# Patient Record
Sex: FEMALE | Race: WHITE | Employment: FULL TIME | URBAN - METROPOLITAN AREA
[De-identification: names, ages, dates, MRNs, and addresses within clinical notes are randomized per-mention and may not be internally consistent; named-entity substitution may affect disease eponyms.]

---

## 2017-06-06 ENCOUNTER — OFFICE VISIT (OUTPATIENT)
Dept: FAMILY MEDICINE CLINIC | Age: 57
End: 2017-06-06

## 2017-06-06 VITALS
TEMPERATURE: 97.2 F | OXYGEN SATURATION: 98 % | WEIGHT: 134 LBS | HEIGHT: 65 IN | SYSTOLIC BLOOD PRESSURE: 106 MMHG | DIASTOLIC BLOOD PRESSURE: 80 MMHG | RESPIRATION RATE: 18 BRPM | HEART RATE: 74 BPM | BODY MASS INDEX: 22.33 KG/M2

## 2017-06-06 DIAGNOSIS — K21.9 GASTROESOPHAGEAL REFLUX DISEASE WITHOUT ESOPHAGITIS: ICD-10-CM

## 2017-06-06 DIAGNOSIS — F32.A DEPRESSION, UNSPECIFIED DEPRESSION TYPE: Primary | ICD-10-CM

## 2017-06-06 DIAGNOSIS — E78.5 DYSLIPIDEMIA: ICD-10-CM

## 2017-06-06 DIAGNOSIS — K26.9 DUODENAL ULCER DISEASE: ICD-10-CM

## 2017-06-06 DIAGNOSIS — M54.2 NECK PAIN: ICD-10-CM

## 2017-06-06 DIAGNOSIS — F41.8 ANXIETY ASSOCIATED WITH DEPRESSION: ICD-10-CM

## 2017-06-06 DIAGNOSIS — M25.552 PAIN OF LEFT HIP JOINT: ICD-10-CM

## 2017-06-06 DIAGNOSIS — I10 ESSENTIAL HYPERTENSION: ICD-10-CM

## 2017-06-06 DIAGNOSIS — G31.84 MILD COGNITIVE IMPAIRMENT: ICD-10-CM

## 2017-06-06 RX ORDER — SERTRALINE HYDROCHLORIDE 50 MG/1
75 TABLET, FILM COATED ORAL DAILY
Qty: 135 TAB | Refills: 3 | Status: SHIPPED | OUTPATIENT
Start: 2017-06-06 | End: 2017-12-20 | Stop reason: SDUPTHER

## 2017-06-06 RX ORDER — RANITIDINE 150 MG/1
150 TABLET, FILM COATED ORAL 2 TIMES DAILY
Qty: 90 TAB | Refills: 3 | Status: SHIPPED | OUTPATIENT
Start: 2017-06-06 | End: 2018-03-23 | Stop reason: SDUPTHER

## 2017-06-06 RX ORDER — AMLODIPINE BESYLATE 2.5 MG/1
2.5 TABLET ORAL DAILY
Qty: 90 TAB | Refills: 3 | Status: SHIPPED | OUTPATIENT
Start: 2017-06-06 | End: 2018-06-11 | Stop reason: SDUPTHER

## 2017-06-06 RX ORDER — ATORVASTATIN CALCIUM 20 MG/1
20 TABLET, FILM COATED ORAL DAILY
Qty: 90 TAB | Refills: 3 | Status: SHIPPED | OUTPATIENT
Start: 2017-06-06 | End: 2018-06-11 | Stop reason: SDUPTHER

## 2017-06-06 NOTE — MR AVS SNAPSHOT
Visit Information Date & Time Provider Department Dept. Phone Encounter #  
 6/6/2017 12:00 PM [address] [facility] Lisseth Sparks MD [facility] [phone] [id number] Upcoming Health Maintenance Date Due Hepatitis C Screening 1960 DTaP/Tdap/Td series (2 - Td) 7/7/2015 INFLUENZA AGE 9 TO ADULT 8/1/2017 BREAST CANCER SCRN MAMMOGRAM 3/3/2018 COLONOSCOPY 8/7/2018 PAP AKA CERVICAL CYTOLOGY 8/17/2018 Allergies as of 6/6/2017  Review Complete On: 6/6/2017 By: Enmanuel Acosta LPN Severity Noted Reaction Type Reactions Cephalexin High 08/31/2009    Rash Penicillins  08/31/2009    Other (comments) Itchy eyes. In childhood Current Immunizations  Reviewed on 12/23/2010 Name Date  
 TB Skin Test (PPD) Intradermal 7/18/2014 TDAP Vaccine 7/7/2005 Not reviewed this visit You Were Diagnosed With   
  
 Codes Comments Depression, unspecified depression type    -  Primary ICD-10-CM: F32.9 ICD-9-CM: 515 Anxiety associated with depression     ICD-10-CM: F41.8 ICD-9-CM: 300.4 Gastroesophageal reflux disease without esophagitis     ICD-10-CM: K21.9 ICD-9-CM: 530.81 Duodenal ulcer disease     ICD-10-CM: K26.9 ICD-9-CM: 532.90 Dyslipidemia     ICD-10-CM: E78.5 ICD-9-CM: 272.4 Essential hypertension     ICD-10-CM: I10 
ICD-9-CM: 401.9 Mild cognitive impairment     ICD-10-CM: G31.84 ICD-9-CM: 331.83 Pain of left hip joint     ICD-10-CM: M25.552 ICD-9-CM: 719.45 Neck pain     ICD-10-CM: M54.2 ICD-9-CM: 723.1 Vitals BP Pulse Temp Resp Height(growth percentile) Weight(growth percentile) 106/80 (BP 1 Location: Left arm, BP Patient Position: Sitting) 74 97.2 °F (36.2 °C) (Oral) 18 5' 5\" (1.651 m) 134 lb (60.8 kg) LMP SpO2 BMI OB Status Smoking Status 12/08/2010 98% 22.3 kg/m2 Postmenopausal Former Smoker Vitals History BMI and BSA Data Body Mass Index Body Surface Area 22.3 kg/m 2 1.67 m 2 Preferred Pharmacy Pharmacy Name Phone [address] AT [address] [place] [address]. [phone] Your Updated Medication List  
  
   
This list is accurate as of: 6/6/17  1:16 PM.  Always use your most recent med list. amLODIPine 2.5 mg tablet Commonly known as:  Mosetta Hark Take 1 Tab by mouth daily. aspirin delayed-release 81 mg tablet TK 1 T PO QD  
  
 atorvastatin 20 mg tablet Commonly known as:  LIPITOR Take 1 Tab by mouth daily. raNITIdine 150 mg tablet Commonly known as:  ZANTAC Take 1 Tab by mouth two (2) times a day. sertraline 50 mg tablet Commonly known as:  ZOLOFT Take 1.5 Tabs by mouth daily. Prescriptions Sent to Pharmacy Refills  
 sertraline (ZOLOFT) 50 mg tablet 3 Sig: Take 1.5 Tabs by mouth daily. Class: Normal  
 Pharmacy: [email or web address] [address] [place] [address], [address] [place] [address] AT [address] [place] [address]. Ph #: [phone] Route: Oral  
 raNITIdine (ZANTAC) 150 mg tablet 3 Sig: Take 1 Tab by mouth two (2) times a day. Class: Normal  
 [facility] [address] [place] [address], [address] [place] [address] AT [address] [place] [address] Road. Ph #: [phone] Route: Oral  
 amLODIPine (NORVASC) 2.5 mg tablet 3 Sig: Take 1 Tab by mouth daily. Class: Normal  
 Pharmacy: [facility] [address] [place] [address], [address] [place] [address] AT [address] [place] [address] Road. Ph #: [phone] Route: Oral  
 atorvastatin (LIPITOR) 20 mg tablet 3 Sig: Take 1 Tab by mouth daily. Class: Normal  
 Pharmacy: [facility] [address] [place] [address], [address] [place] [address] AT [address] [place] [address]. Ph #: [phone] Route: Oral  
  
Patient Instructions TODAY, please go to: CHECK OUT  
 LAB Please schedule the following appointments at [facility]: 
· Disability follow up with Dr Pastor Boyle in 3-4 weeks _____________________ Today's Plan: 
· Have labs ·  we will do complete neuro and ortho exam next time. · We will hold your papers. We are giving you a copy. They are not complete yet. · Start ranitidine every day 
_____________________ Review your health maintenance below. Make plans to return and address anything that is due or will be due soon. Health Maintenance Due Topic Date Due  
 Hepatitis C Test  1960  DTaP/Tdap/Td  (2 - Td) 07/07/2015  
 
 
 
_____________________ Are you stressed out? Overwhelmed? In pain? Feeling disconnected? Consider MINDFULNESS. .. 
[email or web address] 
_____________________ If you need to get your labs done at [facility], visit this site to find a convenient location: [email or web address] Tdap vaccine Please get a tetanus plus pertussis (whooping cough) vaccine at your pharmacy. This is also known as the Tdap vaccine. Ask the pharmacist to tell you what your copay will be. If you need to you can call your insurance company to ask more questions. After you get the vaccine, have the pharmacist fax in documentation to the office. Introducing [facility]! Seema Monae introduces [employer] patient portal. Now you can access parts of your medical record, email your doctor's office, and request medication refills online. 1. In your internet browser, go to [email or web address] 2. Click on the First Time User? Click Here link in the Sign In box. You will see the New Member Sign Up page. 3. Enter your [employer] Access Code exactly as it appears below. You will not need to use this code after youve completed the sign-up process. If you do not sign up before the expiration date, you must request a new code. · [employer] Access Code: [email or web address] Expires: 6/11/2017 10:07 AM 
 
 4. Enter the last four digits of your Social Security Number (xxxx) and Date of Birth (mm/dd/yyyy) as indicated and click Submit. You will be taken to the next sign-up page. 5. Create a [employer] ID. This will be your [employer] login ID and cannot be changed, so think of one that is secure and easy to remember. 6. Create a [employer] password. You can change your password at any time. 7. Enter your Password Reset Question and Answer. This can be used at a later time if you forget your password. 8. Enter your e-mail address. You will receive e-mail notification when new information is available in [address] 9. Click Sign Up. You can now view and download portions of your medical record. 10. Click the Download Summary menu link to download a portable copy of your medical information. If you have questions, please visit the Frequently Asked Questions section of the [employer] website. Remember, [employer] is NOT to be used for urgent needs. For medical emergencies, dial 911. Now available from your iPhone and Android! Please provide this summary of care documentation to your next provider. Your primary care clinician is listed as Broderick Sanchez. Booker Patino. If you have any questions after today's visit, please call [phone].

## 2017-06-06 NOTE — PATIENT INSTRUCTIONS
TODAY, please go to:   CHECK OUT    LAB    Please schedule the following appointments at CHECK OUT:  · Disability follow up with Dr Liz Woodson in 3-4 weeks   _____________________     ZKJGD'B Plan:  · Have labs  ·  we will do complete neuro and ortho exam next time. · We will hold your papers. We are giving you a copy. They are not complete yet. · Start ranitidine every day  _____________________     Review your health maintenance below. Make plans to return and address anything that is due or will be due soon. Health Maintenance Due   Topic Date Due    Hepatitis C Test  1960    DTaP/Tdap/Td  (2 - Td) 07/07/2015         _____________________     Are you stressed out? Overwhelmed? In pain? Feeling disconnected? Consider MINDFULNESS. ..  [email or web address]  _____________________     If you need to get your labs done at [facility], visit this site to find a convenient location: [email or web address]      Tdap vaccine  Please get a tetanus plus pertussis (whooping cough) vaccine at your pharmacy. This is also known as the Tdap vaccine. Ask the pharmacist to tell you what your copay will be. If you need to you can call your insurance company to ask more questions. After you get the vaccine, have the pharmacist fax in documentation to the office.

## 2017-06-06 NOTE — PROGRESS NOTES
Chief Complaint   Patient presents with    Medication Evaluation    Form Completion     stated that she needs paperwork for [facility] completed. 1. Have you been to the ER, urgent care clinic since your last visit? Hospitalized since your last visit? Yes, heart problems     2. Have you seen or consulted any other health care providers outside of the [address] [place] [address] since your last visit? Include any pap smears or colon screening. Yes, ER doctor for heart     The patient was counseled on the dangers of tobacco use, and was advised never to start again. Reviewed strategies to maximize success, including never to start again. Health Maintenance Due   Topic Date Due    Hepatitis C Screening Discussed with patient today and advised to follow up.    1960    DTaP/Tdap/Td series (2 - Td) Discussed with patient today and advised to follow up. Patient declines. 07/07/2015     ACP is not on file, advised to return.

## 2017-06-06 NOTE — PROGRESS NOTES
[address] Visit   Patient ID:   Stephanie Canela is a 59 y.o. female. Assessment/Plan:    Mariely Ji was seen today for medication evaluation, form completion and hip pain. Diagnoses and all orders for this visit:    Medications refilled for 90d. Form completed in detail for disability eval.   Return to complete eval as noted. Depression, unspecified depression type    Anxiety associated with depression  -     sertraline (ZOLOFT) 50 mg tablet; Take 1.5 Tabs by mouth daily. Gastroesophageal reflux disease without esophagitis  -     raNITIdine (ZANTAC) 150 mg tablet; Take 1 Tab by mouth two (2) times a day. Duodenal ulcer disease    Dyslipidemia  -     atorvastatin (LIPITOR) 20 mg tablet; Take 1 Tab by mouth daily. Essential hypertension  -     amLODIPine (NORVASC) 2.5 mg tablet; Take 1 Tab by mouth daily. Mild cognitive impairment    Pain of left hip joint  -     SED RATE (ESR)  -     RHEUMATOID FACTOR, QL  -     C REACTIVE PROTEIN, QT  -     CYCLIC CITRUL PEPTIDE AB, IGG    Neck pain      Next visit: complete neuro exam, complete neck and left hip ortho exam.    Counselled pt on:  Patient health concerns, plan as outlined in patient instructions and above. Patient was offered a choice/choices in the treatment plan today. Patient expresses understanding of the plan and agrees with recommendations. More than 50% of this >25 minute encounter was spent in counseling and coordination of care today. Patient Instructions     TODAY, please go to:   CHECK OUT    LAB    Please schedule the following appointments at CHECK OUT:  · Disability follow up with Dr Bennett Bose in 3-4 weeks   _____________________     JQZUMARCOS'A Plan:  · Have labs  ·  we will do complete neuro and ortho exam next time. · We will hold your papers. We are giving you a copy. They are not complete yet. · Start ranitidine every day  _____________________     Review your health maintenance below. Make plans to return and address anything that is due or will be due soon. Health Maintenance Due   Topic Date Due    Hepatitis C Test  1960    DTaP/Tdap/Td  (2 - Td) 07/07/2015         _____________________     Are you stressed out? Overwhelmed? In pain? Feeling disconnected? Consider MINDFULNESS. ..  [email or web address]  _____________________     If you need to get your labs done at [facility], visit this site to find a convenient location: [email or web address]      Tdap vaccine  Please get a tetanus plus pertussis (whooping cough) vaccine at your pharmacy. This is also known as the Tdap vaccine. Ask the pharmacist to tell you what your copay will be. If you need to you can call your insurance company to ask more questions. After you get the vaccine, have the pharmacist fax in documentation to the office. Subjective:   HPI:  Stephanie Canela is a 59 y.o. female being seen for:   Chief Complaint   Patient presents with    Medication Evaluation    Form Completion     stated that she needs paperwork for [facility] completed.  Hip Pain     stated that she had a dexa scan done in 2012 and doctor stated that her hip was tilting inward and she has recently been having some hip pain and notices a mild distortion to the left hip, also stated that pain is worse at night. · Last visit started omeprazole. Took for about 8 weeks. Now no longer taking. Seems to have improved and now getting worse again. 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      · See media for more re: mood, memory, hip pain, neck pain      Screening and Prevention Due:  Health Maintenance Due   Topic Date Due    Hepatitis C Screening  1960    DTaP/Tdap/Td series (2 - Td) 07/07/2015        Review of Systems  Otherwise as noted in HPI    Active Problem List:  Patient Active Problem List   Diagnosis Code    Allergic rhinitis, cause unspecified J30.9    Osteopenia M85.80    Depression F32.9    Pure hypercholesterolemia E78.00    Duodenal ulcer disease K26.9    Menopause Z78.0    Gastroesophageal reflux disease without esophagitis K21.9    Dyslipidemia E78.5    Essential hypertension I10    Mild cognitive impairment G31.84    Pain of left hip joint M25.552    Neck pain M54.2     ? Objective:     Visit Vitals    /80 (BP 1 Location: Left arm, BP Patient Position: Sitting)    Pulse 74    Temp 97.2 °F (36.2 °C) (Oral)    Resp 18    Ht 5' 5\" (1.651 m)    Wt 134 lb (60.8 kg)    SpO2 98%    BMI 22.3 kg/m2     Wt Readings from Last 3 Encounters:   06/06/17 134 lb (60.8 kg)   12/24/16 140 lb (63.5 kg)   12/12/16 134 lb 8 oz (61 kg)     BP Readings from Last 3 Encounters:   06/06/17 106/80   12/12/16 111/71   10/18/16 118/82     No flowsheet data found. Physical Exam   Constitutional: She appears well-developed and well-nourished. No distress. Pulmonary/Chest: Effort normal. No respiratory distress. Neurological: She is alert. Psychiatric: She has a normal mood and affect. Her behavior is normal. Cognition and memory are impaired. She exhibits abnormal recent memory. Allergies   Allergen Reactions    Cephalexin Rash    Penicillins Other (comments)     Itchy eyes. In childhood     Prior to Admission medications    Medication Sig Start Date End Date Taking? Authorizing Provider   sertraline (ZOLOFT) 50 mg tablet Take 1.5 Tabs by mouth daily. 6/6/17  Yes Clearance Dick Steven MD   raNITIdine (ZANTAC) 150 mg tablet Take 1 Tab by mouth two (2) times a day. 6/6/17  Yes Clearance Dcik Steven MD   amLODIPine (NORVASC) 2.5 mg tablet Take 1 Tab by mouth daily. 6/6/17  Yes Clearance Dick Steven MD   atorvastatin (LIPITOR) 20 mg tablet Take 1 Tab by mouth daily.  6/6/17  Yes Marcos Bonilla MD   aspirin delayed-release 81 mg tablet TK 1 T PO QD 12/6/16  Yes Historical Provider     . Cyndi Casillas Social History     Social History    Marital status: SINGLE     Spouse name: N/A    Number of children: N/A    Years of education: N/A     Occupational History     [employer]     Social History Main Topics    Smoking status: Former Smoker     Packs/day: 1.00     Years: 12.00     Types: Cigarettes     Quit date: 10/1/1987    Smokeless tobacco: Never Used    Alcohol use 1.5 - 2.0 oz/week     3 - 4 Glasses of wine per week      Comment: Red wine, weekly     Drug use: No    Sexual activity: Not Currently     Partners: Male     Birth control/ protection: Abstinence     Other Topics Concern    Not on file     Social History Narrative       Past Medical History:   Diagnosis Date    Abnormal Pap smear of cervix 2003    Allergic rhinitis, cause unspecified     Dr Osmany Sanford - trees, grass, molds, cats    Ankle sprain 10/1995    Right.  Breast cyst 2003, 2005    Right then Left. Benign.  Chest pain     12/5/16    Chickenpox 1995    Chronic cystic mastitis     Depression 2006    due to brother's death    Duodenal ulcer disease 1982    Heart palpitations 2005, 2008    Dr. Beth Haknins. due to caffeine or lack of sleep    Menopause 03/2012    Migraine     menstrual    OA (osteoarthritis) 08/2008    Right knee. Dr. Ulysses Grice Osteopenia 08/23/07    Plantar fasciitis 2003    Left.  Pneumonia     RLL    Popliteal cyst 08/2008    Right Knee. Dr. Ulysses Grice Pure hypercholesterolemia     Pure hypercholesterolemia     Radial head fracture, closed 08/1986    due to horse injury    Stress bladder incontinence        Past Surgical History:   Procedure Laterality Date    CARDIAC SURG PROCEDURE UNLIST  12/05/2016    Stress Test and Cath    COLONOSCOPY  08/07/08    Dr. Taran Lawson. No polyps.  due q 5-10    HX COLPOSCOPY  2003, 2008     Fredo Reyes HX CYST REMOVAL      middle digit on left hand.  HX OTHER SURGICAL  1994    Cryosurgery due to Abnormal PAP.   Dr. Hood Gottlieb       OB History      Para Term  AB TAB SAB Ectopic Multiple Living    1 1 1       1          Family History   Problem Relation Age of Onset    Cancer Mother      breast, not sure when dx, postmenopausal    Arthritis-osteo Mother     Hypertension Father     Heart Attack Father      onset at mid 47 y/o   Rachelle March Heart Disease Father     Diabetes Sister      prediabetes    High Cholesterol Sister     High Cholesterol Brother     Diabetes Maternal Grandmother     Stroke Paternal Grandmother     Osteoporosis Paternal Grandmother     Stroke Paternal Grandfather     Hypertension Brother     Other Brother      gliocytoma x 1

## 2017-06-07 LAB
CCP IGA+IGG SERPL IA-ACNC: 5 UNITS (ref 0–19)
CRP SERPL-MCNC: 0.5 MG/L (ref 0–4.9)
ERYTHROCYTE [SEDIMENTATION RATE] IN BLOOD BY WESTERGREN METHOD: 2 MM/HR (ref 0–40)
RHEUMATOID FACT SERPL-ACNC: <10 IU/ML (ref 0–13.9)

## 2017-06-15 ENCOUNTER — OFFICE VISIT (OUTPATIENT)
Dept: FAMILY MEDICINE CLINIC | Age: 57
End: 2017-06-15

## 2017-06-15 ENCOUNTER — TELEPHONE (OUTPATIENT)
Dept: FAMILY MEDICINE CLINIC | Age: 57
End: 2017-06-15

## 2017-06-15 VITALS
SYSTOLIC BLOOD PRESSURE: 102 MMHG | DIASTOLIC BLOOD PRESSURE: 66 MMHG | BODY MASS INDEX: 23.32 KG/M2 | HEART RATE: 70 BPM | WEIGHT: 140 LBS | TEMPERATURE: 97.7 F | OXYGEN SATURATION: 97 % | RESPIRATION RATE: 16 BRPM | HEIGHT: 65 IN

## 2017-06-15 DIAGNOSIS — M54.2 NECK PAIN: ICD-10-CM

## 2017-06-15 DIAGNOSIS — G31.84 MILD COGNITIVE IMPAIRMENT: ICD-10-CM

## 2017-06-15 DIAGNOSIS — Z11.59 NEED FOR HEPATITIS C SCREENING TEST: ICD-10-CM

## 2017-06-15 DIAGNOSIS — F32.A DEPRESSION, UNSPECIFIED DEPRESSION TYPE: ICD-10-CM

## 2017-06-15 DIAGNOSIS — Z02.71 DISABILITY EXAMINATION: Primary | ICD-10-CM

## 2017-06-15 DIAGNOSIS — M25.552 PAIN OF LEFT HIP JOINT: ICD-10-CM

## 2017-06-15 NOTE — LETTER
6/15/2017 9:58 AM 
 
Ms. Simon [address] [address] [phone] To whom it may concern:  
 
Please see below for medication list, comprehensive neurology exam and comprehensive orthopedic exam.  
 
 
Visit Vitals  /66 (BP 1 Location: Left arm, BP Patient Position: Sitting)  Pulse 70  Temp 97.7 °F (36.5 °C) (Oral)  Resp 16  
 Ht 5' 5\" (1.651 m)  Wt 140 lb (63.5 kg)  SpO2 97%  BMI 23.3 kg/m2 Wt Readings from Last 3 Encounters:  
06/15/17 140 lb (63.5 kg) 06/06/17 134 lb (60.8 kg) 12/24/16 140 lb (63.5 kg) BP Readings from Last 3 Encounters:  
06/15/17 102/66  
06/06/17 106/80  
12/12/16 111/71 Physical Exam  
Eyes: EOM are normal. Pupils are equal, round, and reactive to light. Neck: Muscular tenderness (bilateral ) present. No spinous process tenderness present. Decreased ROM as follows: flexion, rotation to left, rotation to right, lateral bend to left, lateral bend to right Normal extension Normal appearance. No visible exterior deformity. Neg spurling b/l Musculoskeletal: No ttp to shoulders, elbows, wrists, hands, knees, ankles. Neurological: She has a normal Finger-Nose-Finger Test, a normal Heel to [employer] and a normal Romberg Test.  
Reflex Scores: 
     Bicep reflexes are 2+ on the right side and 2+ on the left side. Patellar reflexes are 2+ on the right side and 2+ on the left side. Achilles reflexes are 2+ on the right side and 2+ on the left side. Psychiatric: Her speech is normal. She is not agitated and not aggressive. Cognition and memory are impaired. She exhibits abnormal recent memory and abnormal remote memory. Mildly anxious Cristi Barthel Cristi Barthel Neurologic Exam  
 
Mental Status Disoriented to person. Disoriented to place. Oriented to year, month and date. Speech: speech is normal  
Level of consciousness: alert Knowledge: consistent with education. Cranial Nerves CN II Visual acuity: normal with correction CN III, IV, VI  
Pupils are equal, round, and reactive to light. Extraocular motions are normal.  
Nystagmus: none Ophthalmoparesis: none CN V Facial sensation intact. CN VII Facial expression full, symmetric. CN VIII  
CN VIII normal.  
Hearing: intact CN IX, X  
CN IX normal.  
Palate: symmetric CN XI  
CN XI normal.  
Right sternocleidomastoid strength: normal 
Left sternocleidomastoid strength: normal 
Right trapezius strength: normal 
Left trapezius strength: normal 
 
CN XII  
CN XII normal.  
Tongue: not atrophic Motor Exam  
Muscle bulk: normal 
Overall muscle tone: normal 
 
Strength Right deltoid: 5/5 Left deltoid: 5/5 Right biceps: 5/5 Left biceps: 5/5 Right triceps: 5/5 Left triceps: 5/5 Right quadriceps: 5/5 Left quadriceps: 5/5 Right hamstrin/5 Left hamstrin/5 Right anterior tibial: 5/5 Left anterior tibial: 5/5 Right posterior tibial: 5/5 Left posterior tibial: 5/5 Right peroneal: 5/5 Left peroneal: 5/5 Right gastroc: 5/5 Left gastroc: 5/5 Sensory Exam  
Light touch normal.  
 
Gait, Coordination, and Reflexes Coordination Romberg: negative Finger to nose coordination: normal 
Heel to shin coordination: normal 
 
Tremor Resting tremor: absent Intention tremor: absent Reflexes Right biceps: 2+ Left biceps: 2+ Right patellar: 2+ Left patellar: 2+ Right achilles: 2+ Left achilles: 2+ Dundee Halt Dundee Halt Left Hip Exam  
 
Comments:  No LS midline ttp There is lumbar muscular ttp on left There is some ttp on left iliac crest 
 
Pain at hip with IR Pain in lower back with ER There is ttp at greater trochanter. Back Exam  
 
Muscle Strength Right Quadriceps:  5/5 Left Quadriceps:  5/5 Right Hamstrings:  5/5 Left Hamstrings:  5/5 Dundee Halt Dundee Halt Left Hip Exam  
 
Tenderness The patient is experiencing tenderness in the greater trochanter, lateral. 
 
Range of Motion Extension:           Abnormal 
Flexion:                 Abnormal 
Internal Rotation:  Abnormal 
External Rotation: Abduction:            Normal 
Adduction:            Normal 
 
Muscle Strength Abduction:  5/5 Adduction:  5/5 Flexion:      5/5 Tests Miky: Positive Sandra:  Negative Comments:  No LS midline ttp There is lumbar muscular ttp on left There is some ttp on left iliac crest 
 
Pain at hip with IR Pain in lower back with ER There is ttp at greater trochanter. Allergies Allergen Reactions  Cephalexin Rash  Penicillins Other (comments) Itchy eyes. In childhood Prior to Admission medications Medication Sig Start Date End Date Taking? Authorizing Provider  
sertraline (ZOLOFT) 50 mg tablet Take 1.5 Tabs by mouth daily. 6/6/17  Yes Charmayne Severe Andrea Barnes, MD  
raNITIdine (ZANTAC) 150 mg tablet Take 1 Tab by mouth two (2) times a day. 6/6/17  Yes Charmayne Severe Andrea Barnes, MD  
amLODIPine (NORVASC) 2.5 mg tablet Take 1 Tab by mouth daily. 6/6/17  Yes Charmayne Severe Andrea Barnes, MD  
atorvastatin (LIPITOR) 20 mg tablet Take 1 Tab by mouth daily. 6/6/17  Yes Charmayne Severe Andrea Barnes, MD  
aspirin delayed-release 81 mg tablet TK 1 T PO QD 12/6/16  Yes Historical Provider Sincerely, 
 
 
Charmayne Severe Andrea Barnes, MD

## 2017-06-15 NOTE — MR AVS SNAPSHOT
Visit Information Date & Time Provider Department Dept. Phone Encounter #  
 6/15/2017  9:00 AM [address] Dania Deutsch MD [facility] [phone] [id number] Upcoming Health Maintenance Date Due Hepatitis C Screening 1960 DTaP/Tdap/Td series (2 - Td) 7/7/2015 INFLUENZA AGE 9 TO ADULT 8/1/2017 BREAST CANCER SCRN MAMMOGRAM 3/3/2018 COLONOSCOPY 8/7/2018 PAP AKA CERVICAL CYTOLOGY 8/17/2018 Allergies as of 6/15/2017  Review Complete On: 6/15/2017 By: Cheryl Cid LPN Severity Noted Reaction Type Reactions Cephalexin High 08/31/2009    Rash Penicillins  08/31/2009    Other (comments) Itchy eyes. In childhood Current Immunizations  Reviewed on 12/23/2010 Name Date  
 TB Skin Test (PPD) Intradermal 7/18/2014 TDAP Vaccine 7/7/2005 Not reviewed this visit Vitals BP Pulse Temp Resp Height(growth percentile) Weight(growth percentile) 102/66 (BP 1 Location: Left arm, BP Patient Position: Sitting) 70 97.7 °F (36.5 °C) (Oral) 16 5' 5\" (1.651 m) 140 lb (63.5 kg) LMP SpO2 BMI OB Status Smoking Status 12/08/2010 97% 23.3 kg/m2 Postmenopausal Former Smoker BMI and BSA Data Body Mass Index Body Surface Area  
 23.3 kg/m 2 1.71 m 2 Preferred Pharmacy Pharmacy Name Phone [address] [facility] AT [address] [place] [address]. [phone] Your Updated Medication List  
  
   
This list is accurate as of: 6/15/17 10:13 AM.  Always use your most recent med list. amLODIPine 2.5 mg tablet Commonly known as:  John Equality Take 1 Tab by mouth daily. aspirin delayed-release 81 mg tablet TK 1 T PO QD  
  
 atorvastatin 20 mg tablet Commonly known as:  LIPITOR Take 1 Tab by mouth daily. raNITIdine 150 mg tablet Commonly known as:  ZANTAC Take 1 Tab by mouth two (2) times a day. sertraline 50 mg tablet Commonly known as:  ZOLOFT Take 1.5 Tabs by mouth daily. Introducing [facility]! Dear Tiny Sylvester: 
Thank you for requesting a AddFleet account. Our records indicate that you already have an active AddFleet account. You can access your account anytime at [email or web address]. [email or web address] Did you know that you can access your hospital and ER discharge instructions at any time in AddFleet? You can also review all of your test results from your hospital stay or ER visit. Additional Information If you have questions, please visit the Frequently Asked Questions section of the AddFleet website at [email or web address] Remember, AddFleet is NOT to be used for urgent needs. For medical emergencies, dial 911. Now available from your iPhone and Android! Please provide this summary of care documentation to your next provider. Your primary care clinician is listed as Sandip English. If you have any questions after today's visit, please call [phone].

## 2017-06-15 NOTE — TELEPHONE ENCOUNTER
----- Message from Opal Wynn. Lenin Ortega MD sent at 6/15/2017 11:18 AM EDT -----  Regarding: call patient and   Will you call patient and recommend the following. I recommend sharing this with patient and . Since her insurance may be changing, it is a good idea to update her health maintenance:  - schedule a lab visit before the end of the month to complete hepatitis C screening (blood test)  - schedule nurse visit to get Tdap. Her last one was in 2005. Do both of these before the end of June. Also ask them to schedule follow up in 6 months for mood, blood pressure, memory.     Thank you,  CWT

## 2017-06-15 NOTE — TELEPHONE ENCOUNTER
Contacted patient to try and advise her of recommendations, of things needed to be completed before her insurance coverage ends at the end of June. No answer advised patient to give me a call back here at the office.  ARR 06/15/17

## 2017-06-15 NOTE — PROGRESS NOTES
[address] Visit   Patient ID:   Kayce Borjas is a 59 y.o. female. Assessment/Plan:    Charles Greene was seen today for form completion and neurologic problem. Diagnoses and all orders for this visit:    Disability examination  Mild cognitive impairment  Pain of left hip joint  Neck pain  Depression, unspecified depression type  Paper work completed today. Copy of paperwork and supporting documents given to pt and  and original was faxed. Reviewed looking for activities     Need for hepatitis C screening test  Can get test next time she has labs. -     HEPATITIS C AB    Next visit: f/u in 6 month for mood, memory, BP follow up  Recommend tdap and hcv test this month. Nurse will call    Counselled pt on:  Patient health concerns, plan as outlined in patient instructions and above. Patient was offered a choice/choices in the treatment plan today. Patient expresses understanding of the plan and agrees with recommendations.     Subjective:   HPI:  Kayce Borjas is a 59 y.o. female being seen for:   Chief Complaint   Patient presents with    Form Completion    Neurologic Problem     cognitive test to be completed      · Present with   · Here to complete exam for disability  · Last day of school is tomorrow  · Wonders what type of work/activities she could do  · Stress from forgetting things even with house tasks  · Wanted to make sure I knew about her prior heart evaluation around the start of the year    Screening and Prevention Due:  Health Maintenance Due   Topic Date Due    Hepatitis C Screening  1960    DTaP/Tdap/Td series (2 - Td) 07/07/2015     Immunization History   Administered Date(s) Administered    TB Skin Test (PPD) Intradermal 07/18/2014    TDAP Vaccine 07/07/2005          Review of Systems  Otherwise as noted in HPI    Active Problem List:  Patient Active Problem List   Diagnosis Code    Allergic rhinitis, cause unspecified J30.9    Osteopenia M85.80    Depression F32.9    Pure hypercholesterolemia E78.00    Duodenal ulcer disease K26.9    Menopause Z78.0    Gastroesophageal reflux disease without esophagitis K21.9    Dyslipidemia E78.5    Essential hypertension I10    Mild cognitive impairment G31.84    Pain of left hip joint M25.552    Neck pain M54.2     ? Objective:     Visit Vitals    /66 (BP 1 Location: Left arm, BP Patient Position: Sitting)    Pulse 70    Temp 97.7 °F (36.5 °C) (Oral)    Resp 16    Ht 5' 5\" (1.651 m)    Wt 140 lb (63.5 kg)    SpO2 97%    BMI 23.3 kg/m2     Wt Readings from Last 3 Encounters:   06/15/17 140 lb (63.5 kg)   06/06/17 134 lb (60.8 kg)   12/24/16 140 lb (63.5 kg)     BP Readings from Last 3 Encounters:   06/15/17 102/66   06/06/17 106/80   12/12/16 111/71     PHQ over the last two weeks 6/6/2017   Little interest or pleasure in doing things More than half the days   Feeling down, depressed or hopeless More than half the days   Total Score PHQ 2 4   Trouble falling or staying asleep, or sleeping too much Not at all   Feeling tired or having little energy Nearly every day   Poor appetite or overeating Nearly every day   Feeling bad about yourself - or that you are a failure or have let yourself or your family down More than half the days   Trouble concentrating on things such as school, work, reading or watching TV Nearly every day   Moving or speaking so slowly that other people could have noticed; or the opposite being so fidgety that others notice Not at all   Thoughts of being better off dead, or hurting yourself in some way Not at all   PHQ 9 Score 15   How difficult have these problems made it for you to do your work, take care of your home and get along with others Very difficult         Physical Exam   Eyes: EOM are normal. Pupils are equal, round, and reactive to light. Neck: Muscular tenderness (bilateral ) present. No spinous process tenderness present.    Decreased ROM as follows: flexion, rotation to left, rotation to right, lateral bend to left, lateral bend to right    Normal extension     Normal appearance. No visible exterior deformity. Neg spurling b/l   Musculoskeletal:   No ttp to shoulders, elbows, wrists, hands, knees, ankles. Neurological: She has a normal Finger-Nose-Finger Test, a normal Heel to [employer] and a normal Romberg Test.   Reflex Scores:       Bicep reflexes are 2+ on the right side and 2+ on the left side. Patellar reflexes are 2+ on the right side and 2+ on the left side. Achilles reflexes are 2+ on the right side and 2+ on the left side. Psychiatric: Her speech is normal. She is not agitated and not aggressive. Cognition and memory are impaired. She exhibits abnormal recent memory and abnormal remote memory. Mildly anxious        . [facility] Neurologic Exam     Mental Status   Disoriented to person. Disoriented to place. Oriented to year, month and date. Speech: speech is normal   Level of consciousness: alert  Knowledge: consistent with education. Cranial Nerves     CN II   Visual acuity: normal with correction    CN III, IV, VI   Pupils are equal, round, and reactive to light. Extraocular motions are normal.   Nystagmus: none   Ophthalmoparesis: none    CN V   Facial sensation intact. CN VII   Facial expression full, symmetric.      CN VIII   CN VIII normal.   Hearing: intact    CN IX, X   CN IX normal.   Palate: symmetric    CN XI   CN XI normal.   Right sternocleidomastoid strength: normal  Left sternocleidomastoid strength: normal  Right trapezius strength: normal  Left trapezius strength: normal    CN XII   CN XII normal.   Tongue: not atrophic    Motor Exam   Muscle bulk: normal  Overall muscle tone: normal    Strength   Right deltoid: 5/5  Left deltoid: 5/5  Right biceps: 5/5  Left biceps: 5/5  Right triceps: 5/5  Left triceps: 5/5  Right quadriceps: 5/5  Left quadriceps: 5/5  Right hamstrin/5  Left hamstring: 5/5  Right anterior tibial: 5/5  Left anterior tibial: 5/5  Right posterior tibial: 5/5  Left posterior tibial: 5/5  Right peroneal: 5/5  Left peroneal: 5/5  Right gastroc: 5/5  Left gastroc: 5/5    Sensory Exam   Light touch normal.     Gait, Coordination, and Reflexes     Coordination   Romberg: negative  Finger to nose coordination: normal  Heel to shin coordination: normal    Tremor   Resting tremor: absent  Intention tremor: absent    Reflexes   Right biceps: 2+  Left biceps: 2+  Right patellar: 2+  Left patellar: 2+  Right achilles: 2+  Left achilles: 2+      . Bradleynatalya Vilma Left Hip Exam     Comments:  No LS midline ttp  There is lumbar muscular ttp on left  There is some ttp on left iliac crest    Pain at hip with IR  Pain in lower back with ER    There is ttp at greater trochanter. Back Exam     Muscle Strength   Right Quadriceps:  5/5   Left Quadriceps:  5/5   Right Hamstrings:  5/5   Left Hamstrings:  5/5           . Faisal Vilma Left Hip Exam     Tenderness   The patient is experiencing tenderness in the greater trochanter, lateral.    Range of Motion   Extension:           Abnormal  Flexion:                 Abnormal  Internal Rotation:  Abnormal  External Rotation:  Abduction:            Normal  Adduction:            Normal    Muscle Strength   Abduction:  5/5  Adduction:  5/5  Flexion:      5/5    Tests   Miky: Positive  Sandra:  Negative    Comments:  No LS midline ttp  There is lumbar muscular ttp on left  There is some ttp on left iliac crest    Pain at hip with IR  Pain in lower back with ER    There is ttp at greater trochanter. Allergies   Allergen Reactions    Cephalexin Rash    Penicillins Other (comments)     Itchy eyes. In childhood     Prior to Admission medications    Medication Sig Start Date End Date Taking? Authorizing Provider   sertraline (ZOLOFT) 50 mg tablet Take 1.5 Tabs by mouth daily. 6/6/17  Yes Jane Choudhury MD   raNITIdine (ZANTAC) 150 mg tablet Take 1 Tab by mouth two (2) times a day. 6/6/17  Yes Catracho Deutsch MD   amLODIPine (NORVASC) 2.5 mg tablet Take 1 Tab by mouth daily. 6/6/17  Yes Catracho Deutsch MD   atorvastatin (LIPITOR) 20 mg tablet Take 1 Tab by mouth daily. 6/6/17  Yes Catracho Deutsch MD   aspirin delayed-release 81 mg tablet TK 1 T PO QD 12/6/16  Yes Historical Provider

## 2017-06-15 NOTE — PROGRESS NOTES
Chief Complaint   Patient presents with    Form Completion    Neurologic Problem     cognitive test to be completed      1. Have you been to the ER, urgent care clinic since your last visit? Hospitalized since your last visit? No     2. Have you seen or consulted any other health care providers outside of the [address] [place] [address] since your last visit? Include any pap smears or colon screening. No     The patient was counseled on the dangers of tobacco use, and was advised never to start again. Reviewed strategies to maximize success, including never to start again. Health Maintenance Due   Topic Date Due    Hepatitis C Screening Discussed with patient today and advised to follow up. Stated she would have completed. 1960    DTaP/Tdap/Td series (2 - Td) Discussed with patient today and advised to follow up. Stated that she would receive today. 07/07/2015     ACP is not on file, advised to return.

## 2017-08-25 ENCOUNTER — OFFICE VISIT (OUTPATIENT)
Dept: FAMILY MEDICINE CLINIC | Age: 57
End: 2017-08-25

## 2017-08-25 VITALS
SYSTOLIC BLOOD PRESSURE: 114 MMHG | OXYGEN SATURATION: 100 % | WEIGHT: 144 LBS | HEART RATE: 61 BPM | TEMPERATURE: 97 F | BODY MASS INDEX: 23.99 KG/M2 | DIASTOLIC BLOOD PRESSURE: 73 MMHG | HEIGHT: 65 IN | RESPIRATION RATE: 18 BRPM

## 2017-08-25 DIAGNOSIS — Z00.00 LABORATORY EXAM ORDERED AS PART OF ROUTINE GENERAL MEDICAL EXAMINATION: ICD-10-CM

## 2017-08-25 DIAGNOSIS — G31.84 MILD COGNITIVE IMPAIRMENT: ICD-10-CM

## 2017-08-25 DIAGNOSIS — M25.552 PAIN OF BOTH HIP JOINTS: ICD-10-CM

## 2017-08-25 DIAGNOSIS — Z23 ENCOUNTER FOR IMMUNIZATION: ICD-10-CM

## 2017-08-25 DIAGNOSIS — M54.2 NECK PAIN: Primary | ICD-10-CM

## 2017-08-25 DIAGNOSIS — E55.9 VITAMIN D DEFICIENCY: ICD-10-CM

## 2017-08-25 DIAGNOSIS — M25.551 PAIN OF BOTH HIP JOINTS: ICD-10-CM

## 2017-08-25 DIAGNOSIS — I10 ESSENTIAL HYPERTENSION: ICD-10-CM

## 2017-08-25 RX ORDER — DICLOFENAC SODIUM 10 MG/G
2-4 GEL TOPICAL
Qty: 100 G | Refills: 5 | Status: SHIPPED | OUTPATIENT
Start: 2017-08-25 | End: 2021-11-30

## 2017-08-25 RX ORDER — NAPROXEN 500 MG/1
500 TABLET ORAL
Qty: 90 TAB | Refills: 2 | Status: SHIPPED | OUTPATIENT
Start: 2017-08-25 | End: 2021-11-30

## 2017-08-25 NOTE — PATIENT INSTRUCTIONS
TODAY, please go to:   CHECK OUT    Tdap    Please schedule the following appointments at [address] [place] [address]:  · Lab visit, fasting no later than Aug 31- okay per Dr. Carrie Cobb  · [facility] appointment on Aug 30, if available  · Pain, mood, memory follow up with Dr. Carrie Cobb anytime between Nov 2017 and Feb 2018    Today's Plan:  - Flu season is coming! Remember to get your flu vaccine!     - HAVE hip Xrays before Aug 31s  -  prescriptions before Aug 31st  - I am refilling your medicaitons    For neck and hip pain:  - do stretches and exercises  - heat  - voltaren gel - rub into the painful areas of your neck when needed. Do not combine with naprosyn or ibuprofen. - naprosyn- pill by mouth when needed. Do not take with gel.   - can also take over the counter tylenol   Use Tylenol prn pain, fever, or headache. Never exceed more than 3000 milligrams (mg) daily, if you are over 72years old. Never exceed more than 4000 miligrams (mg) daily, if you are under 72years old.

## 2017-08-25 NOTE — PROGRESS NOTES
Chief Complaint   Patient presents with    Form Completion     stated that she needs to follow up on disability, not sure if the forms were completed or not.  Hip Pain     left hip pain     1. Have you been to the ER, urgent care clinic since your last visit? Hospitalized since your last visit? No     2. Have you seen or consulted any other health care providers outside of the [address] [place] [address] since your last visit? Include any pap smears or colon screening. No     The patient was counseled on the dangers of tobacco use, and was advised never to start again. Reviewed strategies to maximize success, including never to start again. Health Maintenance Due   Topic Date Due    Hepatitis C Screening Discussed with patient today and advised to follow up.    1960    DTaP/Tdap/Td series (2 - Td) Discussed with patient today and advised to follow up.    07/07/2015    INFLUENZA AGE 9 TO ADULT Discussed with patient today and advised to follow up.  08/01/2017     ACP is not on file, advised to return.

## 2017-08-25 NOTE — PROGRESS NOTES
.. [address] Visit   Patient ID:   Marco Antonio Robert is a 59 y.o. female. Assessment/Plan:    Diagnoses and all orders for this visit:    1. Neck pain  D/t msk strain. im  -     diclofenac (VOLTAREN) 1 % gel; Apply 2-4 g to affected area four (4) times daily as needed. Massage into neck or hip. Do not take with other NSAID  -     naproxen (NAPROSYN) 500 mg tablet; Take 1 Tab by mouth two (2) times daily as needed. Neck or hip pain    2. Mild cognitive impairment  Continue treating mood. F/u with Neurology in coming months/year    3. Essential hypertension    4. Pain of both hip joints  Image hips. tx per pt instructions  -     diclofenac (VOLTAREN) 1 % gel; Apply 2-4 g to affected area four (4) times daily as needed. Massage into neck or hip. Do not take with other NSAID  -     naproxen (NAPROSYN) 500 mg tablet; Take 1 Tab by mouth two (2) times daily as needed. Neck or hip pain    5. Laboratory exam ordered as part of routine general medical examination  -     LIPID PANEL  -     HEMOGLOBIN A1C WITH EAG  -     CBC W/O DIFF  -     METABOLIC PANEL, COMPREHENSIVE    6. Vitamin D deficiency  -     VITAMIN D, 25 HYDROXY    7. Encounter for immunization  -     Tetanus, diphtheria toxoids and acellular pertussis (TDAP) vaccine, in individuals >=7 years, IM  -     OK IMMUNIZ ADMIN,1 SINGLE/COMB VAC/TOXOID      See patient instructions for more. Counselled pt on:  Patient health concerns, plan as outlined in patient instructions and above. Patient was offered a choice/choices in the treatment plan today. Patient expresses understanding of the plan and agrees with recommendations.         Patient Instructions   TODAY, please go to:   CHECK OUT    Tdap    Please schedule the following appointments at [address] [place] [address]:  · Lab visit, fasting no later than Aug 31- okay per Dr. Atif Means  · [facility] appointment on Aug 30, if available  · Pain, mood, memory follow up with Dr. Atif Means anytime between Nov 2017 and Feb 2018    Today's Plan:  - Flu season is coming! Remember to get your flu vaccine!     - HAVE hip Xrays before Aug 31s  -  prescriptions before Aug 31st  - I am refilling your medicaitons    For neck and hip pain:  - do stretches and exercises  - heat  - voltaren gel - rub into the painful areas of your neck when needed. Do not combine with naprosyn or ibuprofen. - naprosyn- pill by mouth when needed. Do not take with gel.   - can also take over the counter tylenol   Use Tylenol prn pain, fever, or headache. Never exceed more than 3000 milligrams (mg) daily, if you are over 72years old. Never exceed more than 4000 miligrams (mg) daily, if you are under 72years old. Subjective:   HPI:  Armin Carvajal is a 59 y.o. female being seen for:   Chief Complaint   Patient presents with    Form Completion     stated that she needs to follow up on disability, not sure if the forms were completed or not.  Hip Pain     left hip pain    Leg Pain     stated that she gets twitches in her right leg often.  Immunization/Injection     · \"i'm tired\"  · Putting in paperwork for disability, had not finished b/c she is tired.      Neck pain  · See physical from June 2017  ·  just rubs it   · Annoying pain  · Soreness    Leg pain  · Feels pain in b/l lateral hips, sometimes knee  · Occasional low back pain, but mostly lateral thighs, L>R    Mood  · Doing better, not as much rumination, worry  · Male partner helps her   · Still forgets but     Screening and Prevention Due:  Health Maintenance Due   Topic Date Due    Hepatitis C Screening  1960    INFLUENZA AGE 9 TO ADULT  08/01/2017      Review of Systems  Otherwise as noted in HPI    Active Problem List:  Patient Active Problem List   Diagnosis Code    Allergic rhinitis, cause unspecified J30.9    Osteopenia M85.80    Depression F32.9    Pure hypercholesterolemia E78.00    Duodenal ulcer disease K26.9    Menopause Z78.0    Gastroesophageal reflux disease without esophagitis K21.9    Dyslipidemia E78.5    Essential hypertension I10    Mild cognitive impairment G31.84    Pain of left hip joint M25.552    Neck pain M54.2     ? Objective:     Visit Vitals    /73 (BP 1 Location: Left arm, BP Patient Position: Sitting)    Pulse 61    Temp 97 °F (36.1 °C) (Oral)    Resp 18    Ht 5' 5\" (1.651 m)    Wt 144 lb (65.3 kg)    SpO2 100%    BMI 23.96 kg/m2     Wt Readings from Last 3 Encounters:   08/25/17 144 lb (65.3 kg)   06/15/17 140 lb (63.5 kg)   06/06/17 134 lb (60.8 kg)     BP Readings from Last 3 Encounters:   08/25/17 114/73   06/15/17 102/66   06/06/17 106/80       Physical Exam   Constitutional: She appears well-developed and well-nourished. No distress. HENT:   Right Ear: Tympanic membrane, external ear and ear canal normal.   Left Ear: Tympanic membrane, external ear and ear canal normal.   Mouth/Throat: Oropharynx is clear and moist. No oropharyngeal exudate. Eyes: Conjunctivae are normal. Pupils are equal, round, and reactive to light. Right eye exhibits no discharge. Left eye exhibits no discharge. No scleral icterus. Neck: Neck supple. No thyromegaly present. Cardiovascular: Normal rate, regular rhythm and normal heart sounds. Exam reveals no gallop and no friction rub. No murmur heard. Pulmonary/Chest: Effort normal and breath sounds normal. No respiratory distress. She has no decreased breath sounds. She has no wheezes. She has no rhonchi. She has no rales. Abdominal: Soft. Bowel sounds are normal. She exhibits no distension and no mass. There is no hepatosplenomegaly. There is no tenderness. There is no rigidity, no rebound and no guarding. Musculoskeletal: She exhibits no edema. Lateral hips with only mild ttp   Lymphadenopathy:     She has no cervical adenopathy. Right: No supraclavicular adenopathy present. Left: No supraclavicular adenopathy present.    Neurological: She is alert. She has normal strength and normal reflexes. No sensory deficit. She exhibits normal muscle tone. Skin: She is not diaphoretic. Psychiatric: Her behavior is normal. Her mood appears anxious. Allergies   Allergen Reactions    Cephalexin Rash    Penicillins Other (comments)     Itchy eyes. In childhood     Prior to Admission medications    Medication Sig Start Date End Date Taking? Authorizing Provider   diclofenac (VOLTAREN) 1 % gel Apply 2-4 g to affected area four (4) times daily as needed. Massage into neck or hip. Do not take with other NSAID 8/25/17  Yes Birdia Coad. Miller Novak MD   naproxen (NAPROSYN) 500 mg tablet Take 1 Tab by mouth two (2) times daily as needed. Neck or hip pain 8/25/17  Yes Cristina Ingram. Miller Novak MD   sertraline (ZOLOFT) 50 mg tablet Take 1.5 Tabs by mouth daily. 6/6/17  Yes Fabrizio Novak MD   raNITIdine (ZANTAC) 150 mg tablet Take 1 Tab by mouth two (2) times a day. 6/6/17  Yes Fabrizio Novak MD   amLODIPine (NORVASC) 2.5 mg tablet Take 1 Tab by mouth daily. 6/6/17  Yes Fabrizio Noavk MD   atorvastatin (LIPITOR) 20 mg tablet Take 1 Tab by mouth daily. 6/6/17  Yes Fabrizio Novak MD   aspirin delayed-release 81 mg tablet TK 1 T PO QD 12/6/16  Yes Historical Provider

## 2017-08-25 NOTE — MR AVS SNAPSHOT
Visit Information Date & Time Provider Department Dept. Phone Encounter #  
 8/25/2017  9:40 AM Timo Barrett. Yuriy Alaniz MD [facility] [phone] [id number] Your Appointments 10/17/2017 10:00 AM  
COMPLETE 40 with Blake Campuzano Yuriy Alaniz [address] ([address]) Appt Note: Complete 40 (40 min) - lp  
 [address] 
[address] [place] [address]  
[phone]  
  
   
 [address] [facility] [address] [facility] Upcoming Health Maintenance Date Due Hepatitis C Screening 1960 DTaP/Tdap/Td series (2 - Td) 7/7/2015 INFLUENZA AGE 9 TO ADULT 8/1/2017 BREAST CANCER SCRN MAMMOGRAM 3/3/2018 COLONOSCOPY 8/7/2018 PAP AKA CERVICAL CYTOLOGY 8/17/2018 Allergies as of 8/25/2017  Review Complete On: 8/25/2017 By: Morena Aguayo LPN Severity Noted Reaction Type Reactions Cephalexin High 08/31/2009    Rash Penicillins  08/31/2009    Other (comments) Itchy eyes. In childhood Current Immunizations  Reviewed on 12/23/2010 Name Date  
 TB Skin Test (PPD) Intradermal 7/18/2014 TDAP Vaccine 7/7/2005 Not reviewed this visit Vitals BP Pulse Temp Resp Height(growth percentile) Weight(growth percentile) 114/73 (BP 1 Location: Left arm, BP Patient Position: Sitting) 61 97 °F (36.1 °C) (Oral) 18 5' 5\" (1.651 m) 144 lb (65.3 kg) LMP SpO2 BMI OB Status Smoking Status 12/08/2010 100% 23.96 kg/m2 Postmenopausal Former Smoker Vitals History BMI and BSA Data Body Mass Index Body Surface Area  
 23.96 kg/m 2 1.73 m 2 Preferred Pharmacy Pharmacy Name Phone [address] AT [address] [place] [address]. [phone] Your Updated Medication List  
  
   
This list is accurate as of: 8/25/17 11:44 AM.  Always use your most recent med list. amLODIPine 2.5 mg tablet Commonly known as:  Skip Newcomer Take 1 Tab by mouth daily. aspirin delayed-release 81 mg tablet TK 1 T PO QD  
  
 atorvastatin 20 mg tablet Commonly known as:  LIPITOR Take 1 Tab by mouth daily. raNITIdine 150 mg tablet Commonly known as:  ZANTAC Take 1 Tab by mouth two (2) times a day. sertraline 50 mg tablet Commonly known as:  ZOLOFT Take 1.5 Tabs by mouth daily. Patient Instructions TODAY, please go to: CHECK OUT Tdap Please schedule the following appointments at [address] [place] [address]: 
· Lab visit, fasting no later than Aug 31- okay per Dr. Rachael Iverson · [facility] appointment on Aug 30, if available · Pain, mood, memory follow up with Dr. Rachael Iverson anytime between Nov 2017 and Feb 2018 Today's Plan: - Flu season is coming! Remember to get your flu vaccine!  
 
- HAVE hip Xrays before Aug 31s 
-  prescriptions before Aug 31st 
- I am refilling your medicaitons For neck and hip pain: 
- do stretches and exercises 
- heat 
- voltaren gel - rub into the painful areas of your neck when needed. Do not combine with naprosyn or ibuprofen. - naprosyn- pill by mouth when needed. Do not take with gel.  
- can also take over the counter tylenol Use Tylenol prn pain, fever, or headache. Never exceed more than 3000 milligrams (mg) daily, if you are over 72years old. Never exceed more than 4000 miligrams (mg) daily, if you are under 72years old. Introducing [facility]! Dear Chele Ross: 
Thank you for requesting a [employer] account. Our records indicate that you already have an active [employer] account. You can access your account anytime at [email or web address] Did you know that you can access your hospital and ER discharge instructions at any time in [employer]? You can also review all of your test results from your hospital stay or ER visit. Additional Information If you have questions, please visit the Frequently Asked Questions section of the [employer] website at [email or web address] Remember, [employer] is NOT to be used for urgent needs. For medical emergencies, dial 911. Now available from your iPhone and Android! Please provide this summary of care documentation to your next provider. Your primary care clinician is listed as Opal Wynn. Lenin Ortega. If you have any questions after today's visit, please call [phone].

## 2017-08-28 ENCOUNTER — HOSPITAL ENCOUNTER (OUTPATIENT)
Dept: GENERAL RADIOLOGY | Age: 57
Discharge: HOME | End: 2017-08-28
Attending: FAMILY MEDICINE
Payer: COMMERCIAL

## 2017-08-28 DIAGNOSIS — M25.551 PAIN OF BOTH HIP JOINTS: ICD-10-CM

## 2017-08-28 DIAGNOSIS — M25.552 PAIN OF BOTH HIP JOINTS: ICD-10-CM

## 2017-08-28 PROCEDURE — 73521 X-RAY EXAM HIPS BI 2 VIEWS: CPT

## 2017-08-31 LAB
25(OH)D3+25(OH)D2 SERPL-MCNC: 31.1 NG/ML (ref 30–100)
ALBUMIN SERPL-MCNC: 4.3 G/DL (ref 3.5–5.5)
ALBUMIN/GLOB SERPL: 1.7 {RATIO} (ref 1.2–2.2)
ALP SERPL-CCNC: 55 IU/L (ref 39–117)
ALT SERPL-CCNC: 13 IU/L (ref 0–32)
AST SERPL-CCNC: 20 IU/L (ref 0–40)
BILIRUB SERPL-MCNC: 0.5 MG/DL (ref 0–1.2)
BUN SERPL-MCNC: 19 MG/DL (ref 6–24)
BUN/CREAT SERPL: 24 (ref 9–23)
CALCIUM SERPL-MCNC: 9.5 MG/DL (ref 8.7–10.2)
CHLORIDE SERPL-SCNC: 105 MMOL/L (ref 96–106)
CHOLEST SERPL-MCNC: 222 MG/DL (ref 100–199)
CO2 SERPL-SCNC: 27 MMOL/L (ref 18–29)
CREAT SERPL-MCNC: 0.8 MG/DL (ref 0.57–1)
ERYTHROCYTE [DISTWIDTH] IN BLOOD BY AUTOMATED COUNT: 13.9 % (ref 12.3–15.4)
EST. AVERAGE GLUCOSE BLD GHB EST-MCNC: 97 MG/DL
GLOBULIN SER CALC-MCNC: 2.5 G/DL (ref 1.5–4.5)
GLUCOSE SERPL-MCNC: 91 MG/DL (ref 65–99)
HBA1C MFR BLD: 5 % (ref 4.8–5.6)
HCT VFR BLD AUTO: 40.7 % (ref 34–46.6)
HCV AB S/CO SERPL IA: <0.1 S/CO RATIO (ref 0–0.9)
HDLC SERPL-MCNC: 108 MG/DL
HGB BLD-MCNC: 13.7 G/DL (ref 11.1–15.9)
INTERPRETATION, 910389: NORMAL
LDLC SERPL CALC-MCNC: 106 MG/DL (ref 0–99)
MCH RBC QN AUTO: 32.3 PG (ref 26.6–33)
MCHC RBC AUTO-ENTMCNC: 33.7 G/DL (ref 31.5–35.7)
MCV RBC AUTO: 96 FL (ref 79–97)
PLATELET # BLD AUTO: 245 X10E3/UL (ref 150–379)
POTASSIUM SERPL-SCNC: 4.4 MMOL/L (ref 3.5–5.2)
PROT SERPL-MCNC: 6.8 G/DL (ref 6–8.5)
RBC # BLD AUTO: 4.24 X10E6/UL (ref 3.77–5.28)
SODIUM SERPL-SCNC: 146 MMOL/L (ref 134–144)
TRIGL SERPL-MCNC: 41 MG/DL (ref 0–149)
VLDLC SERPL CALC-MCNC: 8 MG/DL (ref 5–40)
WBC # BLD AUTO: 5.3 X10E3/UL (ref 3.4–10.8)

## 2017-12-20 DIAGNOSIS — F41.8 ANXIETY ASSOCIATED WITH DEPRESSION: ICD-10-CM

## 2017-12-21 RX ORDER — SERTRALINE HYDROCHLORIDE 50 MG/1
75 TABLET, FILM COATED ORAL DAILY
Qty: 45 TAB | Refills: 11 | Status: SHIPPED | OUTPATIENT
Start: 2017-12-21 | End: 2018-06-11 | Stop reason: SDUPTHER

## 2017-12-21 NOTE — TELEPHONE ENCOUNTER
Future Appointments  Date Time Provider Lety Malone   1/4/2018 9:20 AM Mauricio Adler. MD [facility]   2/13/2018 9:00 AM Lorraine Cockayne Thelbert Screws, MD [address] [facility]

## 2018-03-23 DIAGNOSIS — K21.9 GASTROESOPHAGEAL REFLUX DISEASE WITHOUT ESOPHAGITIS: ICD-10-CM

## 2018-03-23 RX ORDER — RANITIDINE 150 MG/1
150 TABLET, FILM COATED ORAL 2 TIMES DAILY
Qty: 90 TAB | Refills: 2 | Status: SHIPPED | OUTPATIENT
Start: 2018-03-23 | End: 2021-11-30

## 2018-03-23 NOTE — TELEPHONE ENCOUNTER
PCP: Amber Green. Mateo Leos MD    Last appt: 8/30/2017  No future appointments. Requested Prescriptions     Pending Prescriptions Disp Refills    raNITIdine (ZANTAC) 150 mg tablet 90 Tab 3     Sig: Take 1 Tab by mouth two (2) times a day.      Lab Results   Component Value Date/Time    Sodium 146 (H) 08/30/2017 09:18 AM    Potassium 4.4 08/30/2017 09:18 AM    Chloride 105 08/30/2017 09:18 AM    CO2 27 08/30/2017 09:18 AM    Glucose 91 08/30/2017 09:18 AM    BUN 19 08/30/2017 09:18 AM    Creatinine 0.80 08/30/2017 09:18 AM    BUN/Creatinine ratio 24 (H) 08/30/2017 09:18 AM    GFR est AA 93 08/30/2017 09:18 AM    GFR est non-AA 81 08/30/2017 09:18 AM    Calcium 9.5 08/30/2017 09:18 AM     Lab Results   Component Value Date/Time    Hemoglobin A1c 5.0 08/30/2017 09:18 AM     Lab Results   Component Value Date/Time    Cholesterol, total 222 (H) 08/30/2017 09:18 AM    HDL Cholesterol 108 08/30/2017 09:18 AM    LDL, calculated 106 (H) 08/30/2017 09:18 AM    VLDL, calculated 8 08/30/2017 09:18 AM    Triglyceride 41 08/30/2017 09:18 AM     Lab Results   Component Value Date/Time    TSH 1.200 03/28/2016 09:04 AM

## 2018-04-12 NOTE — PROGRESS NOTES
Your cholesterol was a little elevated in August. Please schedule to see me for a physical this coming August. Please schedule a fasting, lab only, appointment one week before our visit.   Best,  Dr. Brandon Swenson

## 2018-06-08 DIAGNOSIS — F41.8 ANXIETY ASSOCIATED WITH DEPRESSION: ICD-10-CM

## 2018-06-08 DIAGNOSIS — I10 ESSENTIAL HYPERTENSION: ICD-10-CM

## 2018-06-08 DIAGNOSIS — E78.5 DYSLIPIDEMIA: ICD-10-CM

## 2018-06-08 NOTE — TELEPHONE ENCOUNTER
----- Message from Annabella Cabot sent at 6/8/2018  4:31 PM EDT -----  Regarding: /Telephone  Pt is requesting refills on     \"sertraline hcl \" 50 mg tablet \" atorvastatin 20 mg tablet\" [employer] [phone]

## 2018-06-11 NOTE — TELEPHONE ENCOUNTER
PCP: Sugey Lin. Alie Cardona MD    Last appt: 8/30/2017  No future appointments. Requested Prescriptions     Pending Prescriptions Disp Refills    amLODIPine (NORVASC) 2.5 mg tablet 90 Tab 3     Sig: Take 1 Tab by mouth daily.  atorvastatin (LIPITOR) 20 mg tablet 90 Tab 3     Sig: Take 1 Tab by mouth daily.  sertraline (ZOLOFT) 50 mg tablet 45 Tab 11     Sig: Take 1.5 Tabs by mouth daily.        Prior labs and Blood pressures:  BP Readings from Last 3 Encounters:   08/25/17 114/73   06/15/17 102/66   06/06/17 106/80     Lab Results   Component Value Date/Time    Sodium 146 (H) 08/30/2017 09:18 AM    Potassium 4.4 08/30/2017 09:18 AM    Chloride 105 08/30/2017 09:18 AM    CO2 27 08/30/2017 09:18 AM    Glucose 91 08/30/2017 09:18 AM    BUN 19 08/30/2017 09:18 AM    Creatinine 0.80 08/30/2017 09:18 AM    BUN/Creatinine ratio 24 (H) 08/30/2017 09:18 AM    GFR est AA 93 08/30/2017 09:18 AM    GFR est non-AA 81 08/30/2017 09:18 AM    Calcium 9.5 08/30/2017 09:18 AM     Lab Results   Component Value Date/Time    Hemoglobin A1c 5.0 08/30/2017 09:18 AM     Lab Results   Component Value Date/Time    Cholesterol, total 222 (H) 08/30/2017 09:18 AM    HDL Cholesterol 108 08/30/2017 09:18 AM    LDL, calculated 106 (H) 08/30/2017 09:18 AM    VLDL, calculated 8 08/30/2017 09:18 AM    Triglyceride 41 08/30/2017 09:18 AM     Lab Results   Component Value Date/Time    Vitamin D 25-Hydroxy 38.3 12/14/2010 09:12 AM    VITAMIN D, 25-HYDROXY 31.1 08/30/2017 09:18 AM       Lab Results   Component Value Date/Time    TSH 1.200 03/28/2016 09:04 AM

## 2018-06-13 DIAGNOSIS — I10 ESSENTIAL HYPERTENSION: ICD-10-CM

## 2018-06-15 DIAGNOSIS — E78.5 DYSLIPIDEMIA: ICD-10-CM

## 2018-06-15 RX ORDER — ATORVASTATIN CALCIUM 20 MG/1
20 TABLET, FILM COATED ORAL DAILY
Qty: 90 TAB | Refills: 0 | Status: SHIPPED | OUTPATIENT
Start: 2018-06-15 | End: 2018-06-15 | Stop reason: SDUPTHER

## 2018-06-15 RX ORDER — SERTRALINE HYDROCHLORIDE 50 MG/1
75 TABLET, FILM COATED ORAL DAILY
Qty: 45 TAB | Refills: 0 | Status: SHIPPED | OUTPATIENT
Start: 2018-06-15

## 2018-06-15 RX ORDER — AMLODIPINE BESYLATE 2.5 MG/1
2.5 TABLET ORAL DAILY
Qty: 90 TAB | Refills: 0 | Status: SHIPPED | OUTPATIENT
Start: 2018-06-15 | End: 2021-11-30

## 2018-06-18 RX ORDER — AMLODIPINE BESYLATE 2.5 MG/1
TABLET ORAL
Qty: 30 TAB | Refills: 2 | Status: SHIPPED | OUTPATIENT
Start: 2018-06-18 | End: 2021-11-30

## 2018-06-18 RX ORDER — ATORVASTATIN CALCIUM 20 MG/1
TABLET, FILM COATED ORAL
Qty: 30 TAB | Refills: 2 | Status: SHIPPED | OUTPATIENT
Start: 2018-06-18

## 2018-06-18 NOTE — TELEPHONE ENCOUNTER
PCP: Stacie Frausto. Jose Devlin MD    Last appt: 8/30/2017  No future appointments.     Requested Prescriptions     Pending Prescriptions Disp Refills    atorvastatin (LIPITOR) 20 mg tablet [Pharmacy Med Name: ATORVASTATIN 20MG TABLET, UU] 30 Tab 0     Sig: TAKE ONE TABLET BY MOUTH DAILY       Prior labs and Blood pressures:  BP Readings from Last 3 Encounters:   08/25/17 114/73   06/15/17 102/66   06/06/17 106/80     Lab Results   Component Value Date/Time    Sodium 146 (H) 08/30/2017 09:18 AM    Potassium 4.4 08/30/2017 09:18 AM    Chloride 105 08/30/2017 09:18 AM    CO2 27 08/30/2017 09:18 AM    Glucose 91 08/30/2017 09:18 AM    BUN 19 08/30/2017 09:18 AM    Creatinine 0.80 08/30/2017 09:18 AM    BUN/Creatinine ratio 24 (H) 08/30/2017 09:18 AM    GFR est AA 93 08/30/2017 09:18 AM    GFR est non-AA 81 08/30/2017 09:18 AM    Calcium 9.5 08/30/2017 09:18 AM     Lab Results   Component Value Date/Time    Hemoglobin A1c 5.0 08/30/2017 09:18 AM     Lab Results   Component Value Date/Time    Cholesterol, total 222 (H) 08/30/2017 09:18 AM    HDL Cholesterol 108 08/30/2017 09:18 AM    LDL, calculated 106 (H) 08/30/2017 09:18 AM    VLDL, calculated 8 08/30/2017 09:18 AM    Triglyceride 41 08/30/2017 09:18 AM     Lab Results   Component Value Date/Time    Vitamin D 25-Hydroxy 38.3 12/14/2010 09:12 AM    VITAMIN D, 25-HYDROXY 31.1 08/30/2017 09:18 AM       Lab Results   Component Value Date/Time    TSH 1.200 03/28/2016 09:04 AM

## 2018-12-24 DIAGNOSIS — I10 ESSENTIAL HYPERTENSION: ICD-10-CM

## 2018-12-24 DIAGNOSIS — E78.5 DYSLIPIDEMIA: ICD-10-CM

## 2018-12-24 DIAGNOSIS — F41.8 ANXIETY ASSOCIATED WITH DEPRESSION: ICD-10-CM

## 2018-12-24 NOTE — TELEPHONE ENCOUNTER
Requested Prescriptions     Pending Prescriptions Disp Refills    amLODIPine (NORVASC) 2.5 mg tablet 30 Tab 2     Requested Prescriptions     Pending Prescriptions Disp Refills    amLODIPine (NORVASC) 2.5 mg tablet 30 Tab 2    atorvastatin (LIPITOR) 20 mg tablet 30 Tab 2     Requested Prescriptions     Pending Prescriptions Disp Refills    amLODIPine (NORVASC) 2.5 mg tablet 30 Tab 2    atorvastatin (LIPITOR) 20 mg tablet 30 Tab 2    sertraline (ZOLOFT) 50 mg tablet 45 Tab 0     Sig: Take 1.5 Tabs by mouth daily.

## 2018-12-24 NOTE — TELEPHONE ENCOUNTER
PCP: Luz Maria Valiente MD    Last appt: 8/30/2017  No future appointments. Requested Prescriptions     Pending Prescriptions Disp Refills    amLODIPine (NORVASC) 2.5 mg tablet 30 Tab 2    atorvastatin (LIPITOR) 20 mg tablet 30 Tab 2    sertraline (ZOLOFT) 50 mg tablet 45 Tab 0     Sig: Take 1.5 Tabs by mouth daily.        Prior labs and Blood pressures:  BP Readings from Last 3 Encounters:   08/25/17 114/73   06/15/17 102/66   06/06/17 106/80     Lab Results   Component Value Date/Time    Sodium 146 (H) 08/30/2017 09:18 AM    Potassium 4.4 08/30/2017 09:18 AM    Chloride 105 08/30/2017 09:18 AM    CO2 27 08/30/2017 09:18 AM    Glucose 91 08/30/2017 09:18 AM    BUN 19 08/30/2017 09:18 AM    Creatinine 0.80 08/30/2017 09:18 AM    BUN/Creatinine ratio 24 (H) 08/30/2017 09:18 AM    GFR est AA 93 08/30/2017 09:18 AM    GFR est non-AA 81 08/30/2017 09:18 AM    Calcium 9.5 08/30/2017 09:18 AM     Lab Results   Component Value Date/Time    Hemoglobin A1c 5.0 08/30/2017 09:18 AM     Lab Results   Component Value Date/Time    Cholesterol, total 222 (H) 08/30/2017 09:18 AM    HDL Cholesterol 108 08/30/2017 09:18 AM    LDL, calculated 106 (H) 08/30/2017 09:18 AM    VLDL, calculated 8 08/30/2017 09:18 AM    Triglyceride 41 08/30/2017 09:18 AM     Lab Results   Component Value Date/Time    Vitamin D 25-Hydroxy 38.3 12/14/2010 09:12 AM    VITAMIN D, 25-HYDROXY 31.1 08/30/2017 09:18 AM       Lab Results   Component Value Date/Time    TSH 1.200 03/28/2016 09:04 AM

## 2018-12-26 RX ORDER — SERTRALINE HYDROCHLORIDE 50 MG/1
75 TABLET, FILM COATED ORAL DAILY
Qty: 45 TAB | Refills: 0 | OUTPATIENT
Start: 2018-12-26

## 2018-12-26 RX ORDER — ATORVASTATIN CALCIUM 20 MG/1
TABLET, FILM COATED ORAL
Qty: 30 TAB | Refills: 0 | OUTPATIENT
Start: 2018-12-26

## 2018-12-26 RX ORDER — AMLODIPINE BESYLATE 2.5 MG/1
TABLET ORAL
Qty: 30 TAB | Refills: 0 | OUTPATIENT
Start: 2018-12-26

## 2021-11-30 ENCOUNTER — OFFICE VISIT (OUTPATIENT)
Dept: NEUROLOGY | Age: 61
End: 2021-11-30
Payer: MEDICARE

## 2021-11-30 VITALS
OXYGEN SATURATION: 99 % | RESPIRATION RATE: 18 BRPM | WEIGHT: 168.5 LBS | HEIGHT: 65 IN | HEART RATE: 83 BPM | SYSTOLIC BLOOD PRESSURE: 110 MMHG | BODY MASS INDEX: 28.07 KG/M2 | DIASTOLIC BLOOD PRESSURE: 62 MMHG

## 2021-11-30 DIAGNOSIS — R41.3 MEMORY IMPAIRMENT: Primary | ICD-10-CM

## 2021-11-30 DIAGNOSIS — F32.A DEPRESSION, UNSPECIFIED DEPRESSION TYPE: ICD-10-CM

## 2021-11-30 PROCEDURE — G9717 DOC PT DX DEP/BP F/U NT REQ: HCPCS | Performed by: PSYCHIATRY & NEUROLOGY

## 2021-11-30 PROCEDURE — G8752 SYS BP LESS 140: HCPCS | Performed by: PSYCHIATRY & NEUROLOGY

## 2021-11-30 PROCEDURE — G8754 DIAS BP LESS 90: HCPCS | Performed by: PSYCHIATRY & NEUROLOGY

## 2021-11-30 PROCEDURE — 99205 OFFICE O/P NEW HI 60 MIN: CPT | Performed by: PSYCHIATRY & NEUROLOGY

## 2021-11-30 PROCEDURE — 3017F COLORECTAL CA SCREEN DOC REV: CPT | Performed by: PSYCHIATRY & NEUROLOGY

## 2021-11-30 PROCEDURE — G8427 DOCREV CUR MEDS BY ELIG CLIN: HCPCS | Performed by: PSYCHIATRY & NEUROLOGY

## 2021-11-30 PROCEDURE — G8419 CALC BMI OUT NRM PARAM NOF/U: HCPCS | Performed by: PSYCHIATRY & NEUROLOGY

## 2021-11-30 NOTE — LETTER
11/30/2021    Patient: Wilbert Smith   YOB: 1960   Date of Visit: 11/30/2021     Isadora Gaucher. Ward Dugan, [facility]  [address]  [place] [id number]  Via Fax: [phone]    Dear Isadora Gaucher. Ward Dugan MD,      Thank you for referring Ms. Wilbert Smith to [facility] for evaluation. My notes for this consultation are attached. If you have questions, please do not hesitate to call me. I look forward to following your patient along with you.       Sincerely,    Tati Grigsby, DO

## 2021-11-30 NOTE — PROGRESS NOTES
Arely Lee is a 59 y.o. female  HIPAA verified by two patient identifiers. Health Maintenance Due   Topic    COVID-19 Vaccine (1)    Shingrix Vaccine Age 49> (1 of 2)    Breast Cancer Screen Mammogram     Colorectal Cancer Screening Combo     Lipid Screen     Cervical cancer screen     Flu Vaccine (1)     Chief Complaint   Patient presents with    Memory Loss    New Patient     Visit Vitals  /62   Pulse 83   Resp 18   Ht 5' 5\" (1.651 m)   Wt 168 lb 8 oz (76.4 kg)   LMP 12/15/2010   SpO2 99%   BMI 28.04 kg/m²       Pain Scale: 0 - No pain/10  Pain Location:   1. Have you been to the ER, urgent care clinic since your last visit? Hospitalized since your last visit? No    2. Have you seen or consulted any other health care providers outside of the [address] [place] [address] since your last visit? Include any pap smears or colon screening.  No

## 2021-11-30 NOTE — PROGRESS NOTES
NEUROLOGY  NEW PATIENT EVALUATION/CONSULTATION       PATIENT NAME: Wilbert Smith    MRN: [id number]    REASON FOR CONSULTATION: Memory impairment    11/30/21      Previous records (physician notes, laboratory reports, and radiology reports) and imaging studies were reviewed and summarized. My recommendations will be communicated back to the patient's physician(s) via electronic medical record and/or by [address] mail. The patient was accompanied by a friend today. HISTORY OF PRESENT ILLNESS:  Wilbert Smith is a 59 y.o. right handed female presenting for evaluation of memory impairment.       Onset and progression: Short term memory impairment since ~2015 with slow progression since onset     Neuropsychiatric symptoms     Memory: She is having more short term memory impairment, appears forgetful per her friend. Needing prompting from others and repeating herself often. She has difficulty with comprehension. Long term memory is intact. She has stopped driving x 2 years-gave her vehicle to her son. She retired in 2016. Language: Mild difficulty with naming  Executive functioning: no difficulties  Change in personality: none  Socially inappropriate behavior: no  Change in eating habits: no  Physical changes: denies tremors, gait instability/falls  Depressive symptoms: being treated presently for depression  Hallucinations/Delusions: no     Ability to function:  Driving: No  Finances: managing own finances, no issues  Cooking: her friend cooks primarily, lives with her  Manages own medication: yes, no current issues     Prior work-up: Neuro eval 2016  MRI Brain 2016:  \"Mild cerebral atrophy for patient age. There are scattered nonspecific foci of increased T2 signal intensity in the  corona radiata and centrum semiovale. The pattern of abnormal foci in this  patient is nonspecific. Most likely related to chronic microvascular ischemic  change. \"    Prior Neuropsychologic testing performed several years ago-patient cannot recall where this was performed.     Prior treatments: none     FHx of dementia: none    PAST MEDICAL HISTORY:  Past Medical History:   Diagnosis Date    Abnormal Pap smear of cervix 2003    Allergic rhinitis, cause unspecified     Dr Lorenzo Camara - trees, grass, molds, cats    Ankle sprain 10/1995    Right.  Breast cyst 2003, 2005    Right then Left. Benign.  Chest pain     12/5/16    Chickenpox 1995    Chronic cystic mastitis     Depression 2006    due to brother's death    Duodenal ulcer disease 1982    Heart palpitations 2005, 2008    Dr. Earnestine Roland. due to caffeine or lack of sleep    Menopause 03/2012    Migraine     menstrual    OA (osteoarthritis) 08/2008    Right knee. Dr. Jose Meredith Osteopenia 08/23/07    Plantar fasciitis 2003    Left.  Pneumonia     RLL    Popliteal cyst 08/2008    Right Knee. Dr. Jose Meredith Pure hypercholesterolemia     Pure hypercholesterolemia     Radial head fracture, closed 08/1986    due to horse injury    Stress bladder incontinence        PAST SURGICAL HISTORY:  Past Surgical History:   Procedure Laterality Date    COLONOSCOPY  08/07/08    Dr. Sylvie Clark. No polyps. due q 5-10    HX COLPOSCOPY  2003, 2008    Dr. Terrie Zacarias [address] CYST REMOVAL  2013    middle digit on left hand.  HX OTHER SURGICAL  12/1994    Cryosurgery due to Abnormal PAP.   Dr. Queen Peers  12/05/2016    Stress Test and Cath       FAMILY HISTORY:   Family History   Problem Relation Age of Onset    Cancer Mother         breast, not sure when dx, postmenopausal    Arthritis-osteo Mother     Hypertension Father     Heart Attack Father         onset at mid 49 y/o   [facility] Disease Father     Diabetes Sister         prediabetes    High Cholesterol Sister     High Cholesterol Brother     Diabetes Maternal Grandmother     Stroke Paternal Grandmother     Osteoporosis Paternal Grandmother     Stroke Paternal Grandfather  Hypertension Brother     Other Brother         gliocytoma x 1         SOCIAL HISTORY:  Social History     Socioeconomic History    Marital status: SINGLE   Occupational History    Occupation:      Employer: [employer]   Tobacco Use    Smoking status: Former Smoker     Packs/day: 1.00     Years: 12.00     Pack years: 12.00     Types: Cigarettes     Quit date: 10/1/1987     Years since quittin.1    Smokeless tobacco: Never Used   Substance and Sexual Activity    Alcohol use: Yes     Alcohol/week: 2.5 - 3.3 standard drinks     Types: 3 - 4 Glasses of wine per week     Comment: Red wine, weekly     Drug use: No    Sexual activity: Not Currently     Partners: Male     Birth control/protection: Abstinence         MEDICATIONS:   Current Outpatient Medications   Medication Sig Dispense Refill    atorvastatin (LIPITOR) 20 mg tablet TAKE ONE TABLET BY MOUTH DAILY 30 Tab 2    sertraline (ZOLOFT) 50 mg tablet Take 1.5 Tabs by mouth daily. 45 Tab 0         ALLERGIES:  Allergies   Allergen Reactions    Cephalexin Rash    Penicillins Other (comments)     Itchy eyes. In childhood         REVIEW OF SYSTEMS:  10 point ROS reviewed with patient. Please see scanned document under media. PHYSICAL EXAM:  Vital Signs:   Visit Vitals  /62   Pulse 83   Resp 18   Ht 5' 5\" (1.651 m)   Wt 168 lb 8 oz (76.4 kg)   LMP 12/15/2010   SpO2 99%   BMI 28.04 kg/m²        General Medical Exam:  General:  Well appearing, comfortable, in no apparent distress. Eyes/ENT: see cranial nerve examination. Neck: No masses appreciated. Full range of motion without tenderness. Respiratory:  Clear to auscultation, good air entry bilaterally. Cardiac:  Regular rate and rhythm, no murmur. GI:  Soft, non-tender, non-distended abdomen. Bowel sounds normal. No masses, organomegaly. Extremities:  No deformities, edema, or skin discoloration. Skin:  No rashes or lesions. Neurological:  · Mental Status:  Alert and oriented to person, place, and time with fluent speech. · MOCA: 10/30 (see scanned media)  · Cranial Nerves:   CNII/III/IV/VI: visual fields full to confrontation, EOMI, PERRL, no ptosis or nystagmus. CN V: Facial sensation intact bilaterally, masseter 5/5   CN VII: Facial muscles symmetric and strong   CN VIII: Hears finger rub well bilaterally, intact vestibular function   CN IX/X: Normal palatal movement   CN XI: Full strength shoulder shrug bilaterally   CN XII: Tongue protrusion full and midline without fasciculation or atrophy  · Motor: Normal tone and muscle bulk with no pronator drift. No atrophy or fasciculations present on examination. Individual muscle group testing:  Shoulder abduction:   Left:5/5   Right : 5/5    Shoulder adduction:   Left:5/5   Right : 5/5    Elbow flexion:      Left:5/5   Right : 5/5  Elbow extension:    Left:5/5   Right : 5/5   Wrist flexion:    Left:5/5   Right : 5/5  Wrist extension:    Left:5/5   Right : 5/5  Arm pronation:   Left:5/5   Right : 5/5  Arm supination:   Left:5/5   Right : 5/5    Finger flexion:    Left:5/5   Right : 5/5    Finger extension:   Left:5/5   Right : 5/5   Finger abduction:  Left:5/5   Right : 5/5   Finger adduction:   Left:5/5   Right : 5/5  Hip flexion:     Left:5/5   Right : 5/5         Hip extension:   Left:5/5   Right : 5/5    Knee flexion:    Left:5/5   Right : 5/5    Knee extension:   Left:5/5   Right : 5/5    Dorsiflexion:     Left:5/5   Right : 5/5  Plantar flexion:    Left:5/5   Right : 5/5      · MSRs: No crossed adductors or clonus. RIGHT  LEFT   Brachioradialis 2+ 2+   Biceps 2+ 2+   Triceps 2+ 2+   Knee 2+ 2+   Achilles 2+ 2+        Plantar response Downward Downward          · Sensation: Normal and symmetric perception of pinprick, temperature, light touch, proprioception, and vibration; (-) Romberg. · Coordination: No dysmetria.  Normal rapid alternating movements; finger-to-nose and heel-to- shin testing are within normal limits. · Gait: Normal native gait    PERTINENT DATA:  OUTSIDE RECORDS:  The patient provided outside medical records which were reviewed during the course of the visit. The relevant detail are summarized above. INTERNAL RECORDS:  The patient's electronic medical record was reviewed. The relevant details include:    MRI Results (maximum last 3): Results from [place] encounter on 12/24/16    MRI BRAIN W WO CONT    Narrative  CLINICAL HISTORY: Memory loss    INDICATION: Memory loss. COMPARISON: None    TECHNIQUE: MR examination of the brain includes axial and sagittal T1  pre-and-post contrast, axial T2, axial FLAIR, axial gradient echo, axial DWI,  coronal T1 postcontrast.  Neural quadrant imaging. Contrast: The patient was administered gadolinium-based contrast material axial  and coronal T1-weighted postcontrast enhanced imaging was obtained. FINDINGS:  There are confluent periventricular and scattered foci of increased T2 signal  intensity in the corona radiata and centrum semiovale. Foci are not  periventricular, not perpendicular to the corpus, foci of predominantly also not  subcortical slightly more on the left than on the right. Mild sulcal and  ventricular prominence which is temporal predominant and parietal predominant. There is no intracranial mass, hemorrhage or evidence of acute infarction. There is no Chiari or syrinx. The pituitary and infundibulum are grossly  unremarkable. There is no skull base mass. Cerebellopontine angles are grossly  unremarkable. The major intracranial vascular flow-voids are unremarkable. No  evidence of demyelinating process. There is no abnormal parenchymal enhancement. There is no abnormal meningeal  enhancement. The cavernous sinuses are symmetric. Optic chiasm and infundibulum  grossly unremarkable. Orbits are grossly symmetric. Dural venous sinuses are  grossly patent.     The brain architecture is normal. There is no evidence of midline shift or  mass-effect. .  There are no extra-axial fluid collections. The mastoid air cells and paranasal sinuses are well pneumatized and clear. Impression  IMPRESSION:    Mild cerebral atrophy for patient age. There are scattered nonspecific foci of increased T2 signal intensity in the  corona radiata and centrum semiovale. The pattern of abnormal foci in this  patient is nonspecific. Most likely related to chronic microvascular ischemic  change, differential includes infectious inflammatory and demyelinating  processes. Follow-up neuro imaging as clinically warranted. MRI CERV SPINE W WO CONT    Narrative  EXAM:  MRI CERV SPINE W WO CONT  Clinical history: Neck pain  INDICATION:  Cervicalgia. Cervicalgia    COMPARISON: None    TECHNIQUE: MR imaging of the cervical spine was performed using the following  sequences: sagittal T1, T2, STIR;  axial T2, T1 prior to and following contrast  administration. CONTRAST:  6 mL of Gadavist.    FINDINGS:    There is normal alignment of the cervical spine. Vertebral body heights are  maintained. Marrow signal is normal.    The craniocervical junction is intact. The course, caliber, and signal  intensity of the spinal cord are normal. There is no pathologic intrathecal  enhancement. The paraspinal soft tissues are within normal limits. C2-C3:  No herniation or stenosis. C3-C4:  No herniation or stenosis. Disc desiccation and disc bulge. Mild facet  arthropathy. C4-C5:  No herniation or stenosis. Disc desiccation and disc bulge. Canal and  foramina are widely patent. C5-C6: Disc desiccation and disc bulge. Mild right foraminal protrusion. Canal  and foramina are patent. C6-C7:  No herniation or stenosis. C7-T1:  No herniation or stenosis. Impression  IMPRESSION:  Mild multilevel degenerative change. No canal or foraminal stenosis.         ASSESSMENT:      ICD-10-CM ICD-9-CM 1. Memory impairment  R41.3 780.93    2. Depression, unspecified depression type  F32. A 32    59year old female previously seen in 2016 for complaints of memory disturbance with evidence of MCI and uncontrolled depression presenting again for evaluation of progressive cognitive complaints after 5 years. MOCA is 10/30 today with significant impairments in all memory domains (previous [facility] 25/30). Prior neuroimaging including MRI Brain from initial work up was reviewed showing mild cortical atrophy and microvascular ischemic changes without acute process. She is scheduled for neuropsychologic assessment to establish a baseline and exclude pseudodementia. Findings are presently supportive of a severe dementia process, although conversely patient and her significant other report she is able to handle her finances and medication independently without difficulty. She has retired from working and is no longer driving. Finances and medication administration should be monitored to ensure accuracy and prevent errors. Will defer initiation of medications to assist with cognitive impairment until results of neuropsychologic assessment are available. PLAN:  Neuropsychologic testing-patient to arrange for f/u after testing is completed  Heart healthy diet and exercise  Regular scheduled cognitive and social engagement. I have discussed the diagnosis with the patient today and the intended plan as seen in the above orders with both the patient as well as referring provider and/or PCP via electronic correspondence. The patient has received an after-visit summary and questions were answered concerning future plans. The duration of this appointment visit was 60 minutes spent on interview, examination, review of records/labs/imaging, counseling, explanation of diagnosis, planning of further management, documentation and coordination of care. Josh Copeland,   Staff Neurologist  Diplomate, American Board of Psychiatry & Neurology       CC Referring provider:    Leonora Cook MD

## 2022-03-18 PROBLEM — E78.5 DYSLIPIDEMIA: Status: ACTIVE | Noted: 2017-06-06

## 2022-03-19 PROBLEM — M25.552 PAIN OF LEFT HIP JOINT: Status: ACTIVE | Noted: 2017-06-06

## 2022-03-19 PROBLEM — I10 ESSENTIAL HYPERTENSION: Status: ACTIVE | Noted: 2017-06-06

## 2022-03-19 PROBLEM — G31.84 MILD COGNITIVE IMPAIRMENT: Status: ACTIVE | Noted: 2017-06-06

## 2022-03-20 PROBLEM — M54.2 NECK PAIN: Status: ACTIVE | Noted: 2017-06-06

## 2022-08-03 ENCOUNTER — OFFICE VISIT (OUTPATIENT)
Dept: NEUROLOGY | Age: 62
End: 2022-08-03
Payer: MEDICARE

## 2022-08-03 VITALS
DIASTOLIC BLOOD PRESSURE: 68 MMHG | WEIGHT: 168.8 LBS | SYSTOLIC BLOOD PRESSURE: 112 MMHG | OXYGEN SATURATION: 97 % | HEIGHT: 67 IN | BODY MASS INDEX: 26.49 KG/M2 | HEART RATE: 63 BPM

## 2022-08-03 DIAGNOSIS — F02.80 EARLY ONSET ALZHEIMER'S DEMENTIA WITHOUT BEHAVIORAL DISTURBANCE (HCC): Primary | ICD-10-CM

## 2022-08-03 DIAGNOSIS — G30.0 EARLY ONSET ALZHEIMER'S DEMENTIA WITHOUT BEHAVIORAL DISTURBANCE (HCC): Primary | ICD-10-CM

## 2022-08-03 DIAGNOSIS — F32.A DEPRESSION, UNSPECIFIED DEPRESSION TYPE: ICD-10-CM

## 2022-08-03 PROCEDURE — G9717 DOC PT DX DEP/BP F/U NT REQ: HCPCS | Performed by: PSYCHIATRY & NEUROLOGY

## 2022-08-03 PROCEDURE — G8427 DOCREV CUR MEDS BY ELIG CLIN: HCPCS | Performed by: PSYCHIATRY & NEUROLOGY

## 2022-08-03 PROCEDURE — G8754 DIAS BP LESS 90: HCPCS | Performed by: PSYCHIATRY & NEUROLOGY

## 2022-08-03 PROCEDURE — 3017F COLORECTAL CA SCREEN DOC REV: CPT | Performed by: PSYCHIATRY & NEUROLOGY

## 2022-08-03 PROCEDURE — G8419 CALC BMI OUT NRM PARAM NOF/U: HCPCS | Performed by: PSYCHIATRY & NEUROLOGY

## 2022-08-03 PROCEDURE — 99214 OFFICE O/P EST MOD 30 MIN: CPT | Performed by: PSYCHIATRY & NEUROLOGY

## 2022-08-03 PROCEDURE — G8752 SYS BP LESS 140: HCPCS | Performed by: PSYCHIATRY & NEUROLOGY

## 2022-08-03 RX ORDER — DONEPEZIL HYDROCHLORIDE 5 MG/1
TABLET, FILM COATED ORAL
COMMUNITY
Start: 2022-07-25 | End: 2022-08-03 | Stop reason: ALTCHOICE

## 2022-08-03 RX ORDER — SPIRONOLACTONE 25 MG
TABLET ORAL
COMMUNITY

## 2022-08-03 RX ORDER — DONEPEZIL HYDROCHLORIDE 10 MG/1
10 TABLET, FILM COATED ORAL
Qty: 90 TABLET | Refills: 1 | Status: SHIPPED | OUTPATIENT
Start: 2022-08-03 | End: 2022-09-28 | Stop reason: ALTCHOICE

## 2022-08-03 NOTE — PROGRESS NOTES
Neurology Clinic Follow up Note    Patient ID:  Manny Aldridge  [id number]  57 y.o.  1960      Ms. Flavio Gilliam is here for follow up today of  Chief Complaint   Patient presents with    [address] [place] [address] visit over the weekend / memory issues / [address] recently by police [address] [facility] [address] [address] Last Appointment With Me:  11/2021      Interval History:   Last seen approximately 9 months ago. She is accompanied by her  today. He reports some ongoing progression of cognitive decline which he has noticed with slight agitation, non-violet. Her  has noted that pt appears to have occasional visual hallucinations, non-threatening. She was found by police wandering on a main road recently while her  was out of the apartment, appeared confused/overheated per officers and could not navigate home. Patient does not recall events. She was taken to [address] [place] [address] with neuroimaging which did not reveal any acute intracranial pathology per her , although UTI was noted (no records available). She is still residing in the apartment with her . He assist with meal prep, medications and finances. She is otherwise independent for ADLs. Denies weight loss, eating well. No gait instability. She is now maintained on Aricept 5mg qhs x 1 week. She appears to be tolerating medication well. Ability to function:  Driving: No  Finances: handled by her   Cooking: managed by her   Manages own medication: managed by her   Resides: with her , 24h assistance now     PMHx/ PSHx/ FHx/ SHx:  Reviewed and unchanged previous visit. Past Medical History:   Diagnosis Date    Abnormal Pap smear of cervix 2003    Allergic rhinitis, cause unspecified     Dr Cassia Sewell - trees, grass, molds, cats    Ankle sprain 10/1995    Right. Breast cyst 2003, 2005    Right then Left. Benign.     Chest pain     12/5/16    Chickenpox 1995    Chronic cystic mastitis     Depression 2006 due to brother's death    Duodenal ulcer disease 1982    Heart palpitations 2005, 2008    Dr. Meena Mcqueen. due to caffeine or lack of sleep    Menopause 03/2012    Migraine     menstrual    OA (osteoarthritis) 08/2008    Right knee. Dr. Natacha Nicole    Osteopenia 08/23/07    Plantar fasciitis 2003    Left. Pneumonia     RLL    Popliteal cyst 08/2008    Right Knee. Dr. Natacha Nicole    Pure hypercholesterolemia     Pure hypercholesterolemia     Radial head fracture, closed 08/1986    due to horse injury    Stress bladder incontinence          ROS:  Comprehensive review of systems negative except for as noted above. Objective:       Meds:  Current Outpatient Medications   Medication Sig Dispense Refill    donepeziL (ARICEPT) 5 mg tablet       Calcium-Cholecalciferol, D3, (Calcium 600 with Vitamin D3) 600 mg-10 mcg (400 unit) chew Take  by mouth. atorvastatin (LIPITOR) 20 mg tablet TAKE ONE TABLET BY MOUTH DAILY 30 Tab 2    sertraline (ZOLOFT) 50 mg tablet Take 1.5 Tabs by mouth daily. 45 Tab 0       Exam:  Visit Vitals  /68 (BP 1 Location: Left upper arm, BP Patient Position: Sitting)   Pulse 63   Ht 5' 7\" (1.702 m)   Wt 168 lb 12.8 oz (76.6 kg)   LMP 12/15/2010   SpO2 97%   BMI 26.44 kg/m²     NEUROLOGICAL EXAM:  General: Awake, alert, speech fluent. Oriented to self, \"doctor's office\", not date. Delayed recall: 0/5. Serial 7's impaired. CN: PERRL, EOMI without nystagmus, VFF to confrontation, facial sensation and strength are normal and symmetric, hearing is intact to finger rub bilaterally, palate and tongue movements are intact and symmetric. Motor: Normal tone, bulk and strength bilaterally. Reflexes: 2/4 and symmetric, plantar stimulation is flexor. Coordination: FNF, ROSALINA, HTS intact. Sensation: LT intact throughout. Gait: Normal-based and steady.       LABS  Results for orders placed or performed in visit on 08/25/17   LIPID PANEL   Result Value Ref Range    Cholesterol, total 222 (H) 100 - 199 mg/dL Triglyceride 41 0 - 149 mg/dL    HDL Cholesterol 108 >39 mg/dL    VLDL, calculated 8 5 - 40 mg/dL    LDL, calculated 106 (H) 0 - 99 mg/dL   HEMOGLOBIN A1C WITH EAG   Result Value Ref Range    Hemoglobin A1c 5.0 4.8 - 5.6 %    Estimated average glucose 97 mg/dL   CBC W/O DIFF   Result Value Ref Range    WBC 5.3 3.4 - 10.8 x10E3/uL    RBC 4.24 3.77 - 5.28 x10E6/uL    HGB 13.7 11.1 - 15.9 g/dL    HCT 40.7 34.0 - 46.6 %    MCV 96 79 - 97 fL    MCH 32.3 26.6 - 33.0 pg    MCHC 33.7 31.5 - 35.7 g/dL    RDW 13.9 12.3 - 15.4 %    PLATELET 458 456 - 622 Q11W0/GY   METABOLIC PANEL, COMPREHENSIVE   Result Value Ref Range    Glucose 91 65 - 99 mg/dL    BUN 19 6 - 24 mg/dL    Creatinine 0.80 0.57 - 1.00 mg/dL    GFR est non-AA 81 >59 mL/min/1.73    GFR est AA 93 >59 mL/min/1.73    BUN/Creatinine ratio 24 (H) 9 - 23    Sodium 146 (H) 134 - 144 mmol/L    Potassium 4.4 3.5 - 5.2 mmol/L    Chloride 105 96 - 106 mmol/L    CO2 27 18 - 29 mmol/L    Calcium 9.5 8.7 - 10.2 mg/dL    Protein, total 6.8 6.0 - 8.5 g/dL    Albumin 4.3 3.5 - 5.5 g/dL    GLOBULIN, TOTAL 2.5 1.5 - 4.5 g/dL    A-G Ratio 1.7 1.2 - 2.2    Bilirubin, total 0.5 0.0 - 1.2 mg/dL    Alk. phosphatase 55 39 - 117 IU/L    AST (SGOT) 20 0 - 40 IU/L    ALT (SGPT) 13 0 - 32 IU/L   VITAMIN D, 25 HYDROXY   Result Value Ref Range    VITAMIN D, 25-HYDROXY 31.1 30.0 - 100.0 ng/mL   HEPATITIS C AB   Result Value Ref Range    Hep C Virus Ab <0.1 0.0 - 0.9 s/co ratio   CVD REPORT   Result Value Ref Range    INTERPRETATION Note        IMAGING:  MRI Results (most recent):  Results from Hospital Encounter encounter on 12/24/16    MRI BRAIN W WO CONT    Narrative  CLINICAL HISTORY: Memory loss    INDICATION: Memory loss. COMPARISON: None    TECHNIQUE: MR examination of the brain includes axial and sagittal T1  pre-and-post contrast, axial T2, axial FLAIR, axial gradient echo, axial DWI,  coronal T1 postcontrast.  Neural quadrant imaging.   Contrast: The patient was administered gadolinium-based contrast material axial  and coronal T1-weighted postcontrast enhanced imaging was obtained. FINDINGS:  There are confluent periventricular and scattered foci of increased T2 signal  intensity in the corona radiata and centrum semiovale. Foci are not  periventricular, not perpendicular to the corpus, foci of predominantly also not  subcortical slightly more on the left than on the right. Mild sulcal and  ventricular prominence which is temporal predominant and parietal predominant. There is no intracranial mass, hemorrhage or evidence of acute infarction. There is no Chiari or syrinx. The pituitary and infundibulum are grossly  unremarkable. There is no skull base mass. Cerebellopontine angles are grossly  unremarkable. The major intracranial vascular flow-voids are unremarkable. No  evidence of demyelinating process. There is no abnormal parenchymal enhancement. There is no abnormal meningeal  enhancement. The cavernous sinuses are symmetric. Optic chiasm and infundibulum  grossly unremarkable. Orbits are grossly symmetric. Dural venous sinuses are  grossly patent. The brain architecture is normal. There is no evidence of midline shift or  mass-effect. .  There are no extra-axial fluid collections. The mastoid air cells and paranasal sinuses are well pneumatized and clear. Impression  IMPRESSION:    Mild cerebral atrophy for patient age. There are scattered nonspecific foci of increased T2 signal intensity in the  corona radiata and centrum semiovale. The pattern of abnormal foci in this  patient is nonspecific. Most likely related to chronic microvascular ischemic  change, differential includes infectious inflammatory and demyelinating  processes. Follow-up neuro imaging as clinically warranted. Assessment:     Encounter Diagnoses     ICD-10-CM ICD-9-CM   1. Early onset Alzheimer's dementia without behavioral disturbance (HCC)  G30.0 331.0    F02.80 294.10   2. Depression, unspecified depression type  F32. A 311     57 y. o.female initially seen in 2016 for complaints of memory disturbance with evidence of MCI and uncontrolled depression returning for follow up of dementia. Last [facility] 10/30 with significant impairments in all memory domains. Prior neuroimaging including MRI Brain from initial work up was reviewed showing mild cortical atrophy and microvascular ischemic changes without acute process. Findings are presently supportive of a severe dementia process. Her  reports recent episode of wandering, found by police and evaluated at [facility] request records for review. Suspect this is due to progression of her underlying severe early onset dementia process, likely AD. Discussed need for 24/7 assistance at all times. Finances and medication administration should be monitored to ensure accuracy and prevent errors. Will titrate Aricept to further assist with recall/attention deficits. Plan:   Obtain recent hospital records from [address] [place] [address]  Increase Aricept to 10mg qhs  Advise 24h assistance at home  Heart healthy diet and exercise  Regular scheduled cognitive and social engagement. Follow-up and Dispositions    Return in about 6 months (around 2/3/2023). I have discussed the diagnosis with the patient today and the intended plan as seen in the above orders with both the patient as well as referring provider and/or PCP via electronic correspondence. The patient has received an after-visit summary and questions were answered concerning future plans. I have discussed medication side effects and warnings with the patient as well.       Signed:  Jonelle Walker DO  8/3/2022  10:59 AM

## 2022-08-12 ENCOUNTER — TELEPHONE (OUTPATIENT)
Dept: NEUROLOGY | Age: 62
End: 2022-08-12

## 2022-08-12 NOTE — TELEPHONE ENCOUNTER
Called and spoke to the brother about the PT. She has gotten out of the home wandering 3 more times. Wanted some suggestions  Stated to call agencies for home care gave several names. Recommended [facility]  association and [facility]  to find names of communities with memory care secured environments. Recommended to call the [facility] for satellite wander guard. She would wear a ankle bracelet and if she wanders they can locate her easier as long as she keeps the bracelet on.

## 2022-09-28 ENCOUNTER — TELEPHONE (OUTPATIENT)
Dept: NEUROLOGY | Age: 62
End: 2022-09-28

## 2022-09-28 DIAGNOSIS — F02.81 ALZHEIMER'S DEMENTIA WITH BEHAVIORAL DISTURBANCE, UNSPECIFIED TIMING OF DEMENTIA ONSET: Primary | ICD-10-CM

## 2022-09-28 DIAGNOSIS — G30.9 ALZHEIMER'S DEMENTIA WITH BEHAVIORAL DISTURBANCE, UNSPECIFIED TIMING OF DEMENTIA ONSET: Primary | ICD-10-CM

## 2022-09-28 RX ORDER — QUETIAPINE FUMARATE 25 MG/1
12.5 TABLET, FILM COATED ORAL
Qty: 16 TABLET | Refills: 2 | Status: SHIPPED | OUTPATIENT
Start: 2022-09-28 | End: 2022-10-20 | Stop reason: ALTCHOICE

## 2022-09-28 NOTE — TELEPHONE ENCOUNTER
Just spoke with the Pt's brother and he agreed to start with Seroquel 12.5 mg QHS and titrate to twice daily if morning behaviors are continuing.

## 2022-09-29 ENCOUNTER — TELEPHONE (OUTPATIENT)
Dept: NEUROLOGY | Age: 62
End: 2022-09-29

## 2022-09-29 NOTE — TELEPHONE ENCOUNTER
[employer] called regarding a potential medication reaction. The patient is on Donepezil and was prescribed Quetiapine. Pharmacist claimed taking these two medication together may cause interaction. Please call [employer] back to discuss.

## 2022-09-29 NOTE — TELEPHONE ENCOUNTER
Spoke to [employer] about new seroquel order. Confirmed that per the family the Pt is no longer taking Aricept due to possible side effects. So ok to take seroquel. They were concerned about interaction between the 2 meds.

## 2022-10-20 ENCOUNTER — OFFICE VISIT (OUTPATIENT)
Dept: NEUROLOGY | Age: 62
End: 2022-10-20
Payer: MEDICARE

## 2022-10-20 ENCOUNTER — TELEPHONE (OUTPATIENT)
Dept: NEUROLOGY | Age: 62
End: 2022-10-20

## 2022-10-20 VITALS
HEIGHT: 67 IN | OXYGEN SATURATION: 97 % | RESPIRATION RATE: 18 BRPM | SYSTOLIC BLOOD PRESSURE: 127 MMHG | BODY MASS INDEX: 26.37 KG/M2 | DIASTOLIC BLOOD PRESSURE: 89 MMHG | HEART RATE: 74 BPM | WEIGHT: 168 LBS

## 2022-10-20 DIAGNOSIS — F02.80 EARLY ONSET ALZHEIMER'S DEMENTIA WITHOUT BEHAVIORAL DISTURBANCE (HCC): Primary | ICD-10-CM

## 2022-10-20 DIAGNOSIS — G30.0 EARLY ONSET ALZHEIMER'S DEMENTIA WITHOUT BEHAVIORAL DISTURBANCE (HCC): Primary | ICD-10-CM

## 2022-10-20 PROCEDURE — G8752 SYS BP LESS 140: HCPCS | Performed by: NURSE PRACTITIONER

## 2022-10-20 PROCEDURE — G9717 DOC PT DX DEP/BP F/U NT REQ: HCPCS | Performed by: NURSE PRACTITIONER

## 2022-10-20 PROCEDURE — 99215 OFFICE O/P EST HI 40 MIN: CPT | Performed by: NURSE PRACTITIONER

## 2022-10-20 PROCEDURE — G8754 DIAS BP LESS 90: HCPCS | Performed by: NURSE PRACTITIONER

## 2022-10-20 PROCEDURE — G8419 CALC BMI OUT NRM PARAM NOF/U: HCPCS | Performed by: NURSE PRACTITIONER

## 2022-10-20 PROCEDURE — G8428 CUR MEDS NOT DOCUMENT: HCPCS | Performed by: NURSE PRACTITIONER

## 2022-10-20 PROCEDURE — 3017F COLORECTAL CA SCREEN DOC REV: CPT | Performed by: NURSE PRACTITIONER

## 2022-10-20 RX ORDER — MEMANTINE HYDROCHLORIDE 5 MG/1
5 TABLET ORAL 2 TIMES DAILY
Qty: 60 TABLET | Refills: 5 | Status: SHIPPED | OUTPATIENT
Start: 2022-10-20

## 2022-10-20 RX ORDER — QUETIAPINE FUMARATE 50 MG/1
50-100 TABLET, FILM COATED ORAL
Qty: 60 TABLET | Refills: 5 | Status: SHIPPED | OUTPATIENT
Start: 2022-10-20

## 2022-10-20 NOTE — PROGRESS NOTES
Gurwinder Crockett is a 57 y.o. female who presents with the following  Chief Complaint   Patient presents with    Memory Loss     Patient is here with her partner, Pritesh Barr. The patient is following up for memory problems. The patient has a little confusion sometimes. She seems to be doing ok with her day to day stuff. The partner is with her all day everyday. She no longer drives herself anywhere. She started the QUEtiapine at last visit with Dr. Valeria Curtis. The first made the patient sleepy but the second dose she was fine. They don't think theres been a change in the patient after starting the new medication. HPI      Follow-up with family for worsening dementia  She saw Dr. Valeria Curtis in August and was put on a half of tablet of Seroquel at night  This helped for 1 or 2 days but then stopped  Since then she has not been sleeping very well  She has been hallucinating at night and during the day  She has been wandering some also  She is still taking this but its not doing much  Family states she is restless and agitated during the day  She has good and bad days which we discussed is normal  She has no complaints today and has not really in the talking mood  She has been eating well  Her family helps remind her about medications and drives her around and makes her food  She is able to perform all ADLs by herself though  She can get dressed, eat, bathe  She also does walk and has no issues with balance or coordination  She feels like she wants to go home today  She has no headaches, dizziness  No syncope  She does like to watch TV  She does puzzles here and there      Allergies   Allergen Reactions    Cephalexin Rash    Penicillins Other (comments)     Itchy eyes. In childhood       Current Outpatient Medications   Medication Sig    QUEtiapine (SEROquel) 50 mg tablet Take 1-2 Tablets by mouth nightly. memantine (Namenda) 5 mg tablet Take 1 Tablet by mouth two (2) times a day.     Calcium-Cholecalciferol, D3, (Calcium 600 with Vitamin D3) 600 mg-10 mcg (400 unit) chew Take  by mouth. atorvastatin (LIPITOR) 20 mg tablet TAKE ONE TABLET BY MOUTH DAILY    sertraline (ZOLOFT) 50 mg tablet Take 1.5 Tabs by mouth daily. No current facility-administered medications for this visit. Social History     Tobacco Use   Smoking Status Former    Packs/day: 1.00    Years: 12.00    Pack years: 12.00    Types: Cigarettes    Quit date: 10/1/1987    Years since quittin.0   Smokeless Tobacco Never       Past Medical History:   Diagnosis Date    Abnormal Pap smear of cervix     Allergic rhinitis, cause unspecified     Dr Iona Ji - trees, grass, molds, cats    Ankle sprain 10/1995    Right. Breast cyst ,     Right then Left. Benign. Chest pain     16    Chickenpox     Chronic cystic mastitis     Depression     due to brother's death    Duodenal ulcer disease     Heart palpitations ,     Dr. Andrade Ends. due to caffeine or lack of sleep    Menopause 2012    Migraine     menstrual    OA (osteoarthritis) 2008    Right knee. Dr. Bryan Ross    Osteopenia 07    Plantar fasciitis     Left. Pneumonia     RLL    Popliteal cyst 2008    Right Knee. Dr. Bryan Ross    Pure hypercholesterolemia     Pure hypercholesterolemia     Radial head fracture, closed 1986    due to horse injury    Stress bladder incontinence        Past Surgical History:   Procedure Laterality Date    COLONOSCOPY  08    Dr. Yolanda Galeana. No polyps. due q 5-10    HX COLPOSCOPY  2008    Dr. Giovanny Meade CYST REMOVAL      middle digit on left hand. HX OTHER SURGICAL  1994    Cryosurgery due to Abnormal PAP.   Dr. Jerome Hamman  2016    Stress Test and Cath       Family History   Problem Relation Age of Onset    Cancer Mother         breast, not sure when dx, postmenopausal    OSTEOARTHRITIS Mother     Hypertension Father     Heart Attack Father onset at mid 47 y/o    Heart Disease Father     Diabetes Sister         prediabetes    High Cholesterol Sister     High Cholesterol Brother     Hypertension Brother     Other Brother         gliocytoma x 1    Diabetes Maternal Grandmother     Stroke Paternal Grandmother     Osteoporosis Paternal Grandmother     Stroke Paternal Grandfather        Social History     Socioeconomic History    Marital status: SINGLE   Occupational History    Occupation:      Employer: [employer]   Tobacco Use    Smoking status: Former     Packs/day: 1.00     Years: 12.00     Pack years: 12.00     Types: Cigarettes     Quit date: 10/1/1987     Years since quittin.0    Smokeless tobacco: Never   Substance and Sexual Activity    Alcohol use: Yes     Alcohol/week: 2.5 - 3.3 standard drinks     Types: 3 - 4 Glasses of wine per week     Comment: Red wine, weekly     Drug use: No    Sexual activity: Not Currently     Partners: Male     Birth control/protection: Abstinence       Review of Systems   Eyes:  Negative for blurred vision, double vision and photophobia. Respiratory:  Negative for shortness of breath and wheezing. Musculoskeletal:  Negative for falls. Neurological:  Negative for seizures, loss of consciousness and weakness. Psychiatric/Behavioral:  Positive for hallucinations and memory loss. Negative for depression, substance abuse and suicidal ideas. The patient is nervous/anxious and has insomnia. Remainder of comprehensive review is negative. Physical Exam :    Visit Vitals  /89 (BP 1 Location: Right upper arm, BP Patient Position: Sitting, BP Cuff Size: Adult)   Pulse 74   Resp 18   Ht 5' 7\" (1.702 m)   Wt 76.2 kg (168 lb)   LMP 12/15/2010   SpO2 97%   BMI 26.31 kg/m²       General: Well defined, nourished, and groomed individual in no acute distress. Musculoskeletal: Extremities revealed no edema and had full range of motion of joints. Psych: flat affect     NEUROLOGICAL EXAMINATION:    Mental Status: Alert and oriented to person    Cranial Nerves:    II, III, IV, VI: Visual acuity grossly intact. Visual fields are normal.    Pupils are equal, round, and reactive to light and accommodation. Extra-ocular movements are full and fluid. Fundoscopic exam was benign, no ptosis or nystagmus. V-XII: Hearing is grossly intact. Facial features are symmetric, with normal sensation and strength. The palate rises symmetrically and the tongue protrudes midline. Sternocleidomastoids 5/5. Motor Examination: Normal tone, bulk, and strength, 5/5 muscle strength throughout. Coordination: Finger to nose was normal. No resting or intention tremor    Gait and Station: Steady while walking. Normal arm swing. No pronator drift. No muscle wasting or fasiculations noted. Reflexes: DTRs 2+ throughout.         Results for orders placed or performed in visit on 08/25/17   LIPID PANEL   Result Value Ref Range    Cholesterol, total 222 (H) 100 - 199 mg/dL    Triglyceride 41 0 - 149 mg/dL    HDL Cholesterol 108 >39 mg/dL    VLDL, calculated 8 5 - 40 mg/dL    LDL, calculated 106 (H) 0 - 99 mg/dL   HEMOGLOBIN A1C WITH EAG   Result Value Ref Range    Hemoglobin A1c 5.0 4.8 - 5.6 %    Estimated average glucose 97 mg/dL   CBC W/O DIFF   Result Value Ref Range    WBC 5.3 3.4 - 10.8 x10E3/uL    RBC 4.24 3.77 - 5.28 x10E6/uL    HGB 13.7 11.1 - 15.9 g/dL    HCT 40.7 34.0 - 46.6 %    MCV 96 79 - 97 fL    MCH 32.3 26.6 - 33.0 pg    MCHC 33.7 31.5 - 35.7 g/dL    RDW 13.9 12.3 - 15.4 %    PLATELET 734 830 - 732 L92A5/BD   METABOLIC PANEL, COMPREHENSIVE   Result Value Ref Range    Glucose 91 65 - 99 mg/dL    BUN 19 6 - 24 mg/dL    Creatinine 0.80 0.57 - 1.00 mg/dL    GFR est non-AA 81 >59 mL/min/1.73    GFR est AA 93 >59 mL/min/1.73    BUN/Creatinine ratio 24 (H) 9 - 23    Sodium 146 (H) 134 - 144 mmol/L    Potassium 4.4 3.5 - 5.2 mmol/L    Chloride 105 96 - 106 mmol/L CO2 27 18 - 29 mmol/L    Calcium 9.5 8.7 - 10.2 mg/dL    Protein, total 6.8 6.0 - 8.5 g/dL    Albumin 4.3 3.5 - 5.5 g/dL    GLOBULIN, TOTAL 2.5 1.5 - 4.5 g/dL    A-G Ratio 1.7 1.2 - 2.2    Bilirubin, total 0.5 0.0 - 1.2 mg/dL    Alk. phosphatase 55 39 - 117 IU/L    AST (SGOT) 20 0 - 40 IU/L    ALT (SGPT) 13 0 - 32 IU/L   VITAMIN D, 25 HYDROXY   Result Value Ref Range    VITAMIN D, 25-HYDROXY 31.1 30.0 - 100.0 ng/mL   HEPATITIS C AB   Result Value Ref Range    Hep C Virus Ab <0.1 0.0 - 0.9 s/co ratio   CVD REPORT   Result Value Ref Range    INTERPRETATION Note        Orders Placed This Encounter    QUEtiapine (SEROquel) 50 mg tablet     Sig: Take 1-2 Tablets by mouth nightly. Dispense:  60 Tablet     Refill:  5    memantine (Namenda) 5 mg tablet     Sig: Take 1 Tablet by mouth two (2) times a day. Dispense:  60 Tablet     Refill:  5       1.  Early onset Alzheimer's dementia without behavioral disturbance ([facility] [address]          Early onset dementia  Seems to be getting worse  We will increase Seroquel to 1 to 2 tablets at night to help with sleep  We discussed side effects and full    When she is on this and is tolerating we will add Namenda at 5 mg twice daily  She had a side effect Aricept and has been off of this  But her family has seen things continue to decline  She had an MRI in 2016 which we will reevaluate if things do not start to look different          This note will not be viewable in [employer]

## 2022-10-20 NOTE — TELEPHONE ENCOUNTER
Calling to ask questions following the appointment he went to with pt today. The questions are from pt brother and are in text so he wasn't able to rely them directly and says it would be best to have nurse call him back so he can get them in order.

## 2022-10-20 NOTE — PROGRESS NOTES
Chief Complaint   Patient presents with    Memory Loss     Patient is here with her partner, Fahad Cruz. The patient is following up for memory problems. The patient has a little confusion sometimes. She seems to be doing ok with her day to day stuff. The partner is with her all day everyday. She no longer drives herself anywhere. She started the QUEtiapine at last visit with Dr. Jenifer Kothari. The first made the patient sleepy but the second dose she was fine. They don't think theres been a change in the patient after starting the new medication.      Visit Vitals  /89 (BP 1 Location: Right upper arm, BP Patient Position: Sitting, BP Cuff Size: Adult)   Pulse 74   Resp 18   Ht 5' 7\" (1.702 m)   Wt 168 lb (76.2 kg)   SpO2 97%   BMI 26.31 kg/m²

## 2022-10-24 NOTE — TELEPHONE ENCOUNTER
Mri can not show LB dementia   Only testing and presentation     As for the namenda. We upped Seroquel to help sleep.    She is ok to start namenda today if she is tolerating that well

## 2023-05-10 ENCOUNTER — OFFICE VISIT (OUTPATIENT)
Age: 63
End: 2023-05-10
Payer: COMMERCIAL

## 2023-05-10 ENCOUNTER — CLINICAL DOCUMENTATION (OUTPATIENT)
Age: 63
End: 2023-05-10

## 2023-05-10 VITALS
OXYGEN SATURATION: 94 % | WEIGHT: 116.62 LBS | BODY MASS INDEX: 18.27 KG/M2 | DIASTOLIC BLOOD PRESSURE: 60 MMHG | SYSTOLIC BLOOD PRESSURE: 100 MMHG | HEART RATE: 60 BPM

## 2023-05-10 DIAGNOSIS — G30.0 ALZHEIMER'S DISEASE WITH EARLY ONSET (CODE) (HCC): Primary | ICD-10-CM

## 2023-05-10 DIAGNOSIS — G30.9 ALZHEIMER'S DEMENTIA WITH BEHAVIORAL DISTURBANCE (HCC): Primary | ICD-10-CM

## 2023-05-10 DIAGNOSIS — F02.818 ALZHEIMER'S DEMENTIA WITH BEHAVIORAL DISTURBANCE (HCC): Primary | ICD-10-CM

## 2023-05-10 DIAGNOSIS — R20.0 NUMBNESS OF RIGHT LOWER EXTREMITY: ICD-10-CM

## 2023-05-10 PROCEDURE — 99215 OFFICE O/P EST HI 40 MIN: CPT | Performed by: PSYCHIATRY & NEUROLOGY

## 2023-05-10 PROCEDURE — 1123F ACP DISCUSS/DSCN MKR DOCD: CPT | Performed by: PSYCHIATRY & NEUROLOGY

## 2023-05-10 PROCEDURE — 3078F DIAST BP <80 MM HG: CPT | Performed by: PSYCHIATRY & NEUROLOGY

## 2023-05-10 PROCEDURE — 3074F SYST BP LT 130 MM HG: CPT | Performed by: PSYCHIATRY & NEUROLOGY

## 2023-05-10 RX ORDER — DIVALPROEX SODIUM 250 MG/1
TABLET, DELAYED RELEASE ORAL
COMMUNITY
Start: 2023-05-07

## 2023-05-10 RX ORDER — DIVALPROEX SODIUM 250 MG/1
250 TABLET, DELAYED RELEASE ORAL 2 TIMES DAILY
COMMUNITY

## 2023-05-10 RX ORDER — DIVALPROEX SODIUM 125 MG/1
TABLET, DELAYED RELEASE ORAL
COMMUNITY
Start: 2023-05-07

## 2023-05-10 RX ORDER — MIRTAZAPINE 15 MG/1
7.5 TABLET, FILM COATED ORAL NIGHTLY
COMMUNITY

## 2023-05-10 RX ORDER — DIVALPROEX SODIUM 125 MG/1
125 TABLET, DELAYED RELEASE ORAL DAILY
COMMUNITY

## 2023-05-10 RX ORDER — TRAZODONE HYDROCHLORIDE 50 MG/1
50 TABLET ORAL NIGHTLY
COMMUNITY

## 2023-05-10 RX ORDER — MEMANTINE HYDROCHLORIDE 10 MG/1
TABLET ORAL
Qty: 180 TABLET | Refills: 1 | Status: SHIPPED | OUTPATIENT
Start: 2023-05-10

## 2023-05-10 RX ORDER — LORAZEPAM 1 MG/1
1 TABLET ORAL EVERY 6 HOURS PRN
COMMUNITY

## 2023-05-10 NOTE — PROGRESS NOTES
orders with both the patient as well as referring provider and/or PCP via electronic correspondence. The patient has received an after-visit summary and questions were answered concerning future plans. I have discussed medication side effects and warnings with the patient as well. The duration of this appointment visit was 40 minutes spent on interview, examination, review of records/imaging, counseling, explanation of diagnosis, planning of further management, documentation and coordination of care.     Signed:  Pat Powell DO  5/10/23

## 2023-05-10 NOTE — PROGRESS NOTES
Faxed referral to [facility] received confirmation it was sent successfully.

## 2023-06-23 ENCOUNTER — TELEPHONE (OUTPATIENT)
Age: 63
End: 2023-06-23

## 2023-06-23 NOTE — TELEPHONE ENCOUNTER
Called and spoke to Dr. Apodaca Mealing the Pt's brother two identifiers verified. Calling to let him know about cancelling this FU appt. Coming up since being referred to [address] [place] [address]. He said they do have an appt. Scheduled a few months from now. He stated she has had some improvement  with the changes in medications from the last appt. She is calmer, sleeping better and eating better as well. Let him know to call if he has any questions in the future.

## 2025-05-25 ENCOUNTER — HOSPITAL ENCOUNTER (INPATIENT)
Facility: HOSPITAL | Age: 65
LOS: 2 days | Discharge: NURSING, REHAB OR LONG-TERM CARE | DRG: 690 | End: 2025-05-27
Attending: EMERGENCY MEDICINE | Admitting: HOSPITALIST
Payer: MEDICARE

## 2025-05-25 ENCOUNTER — APPOINTMENT (OUTPATIENT)
Facility: HOSPITAL | Age: 65
DRG: 690 | End: 2025-05-25
Payer: MEDICARE

## 2025-05-25 DIAGNOSIS — N30.00 ACUTE CYSTITIS WITHOUT HEMATURIA: ICD-10-CM

## 2025-05-25 DIAGNOSIS — R79.89 ELEVATED LACTIC ACID LEVEL: ICD-10-CM
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
## 2025-05-25 PROBLEM — N39.0 UTI (URINARY TRACT INFECTION): Status: ACTIVE | Noted: 2025-05-25

## 2025-05-25 LAB
ALBUMIN SERPL-MCNC: 3.2 G/DL (ref 3.5–5)
ALBUMIN/GLOB SERPL: 0.7 (ref 1.1–2.2)
ALP SERPL-CCNC: 79 U/L (ref 45–117)
ALT SERPL-CCNC: 16 U/L (ref 12–78)
ANION GAP SERPL CALC-SCNC: 5 MMOL/L (ref 2–12)
APPEARANCE UR: CLEAR
AST SERPL-CCNC: 23 U/L (ref 15–37)
BACTERIA URNS QL MICRO: NEGATIVE /HPF
BASOPHILS # BLD: 0.04 K/UL (ref 0–0.1)
BASOPHILS NFR BLD: 0.7 % (ref 0–1)
BILIRUB SERPL-MCNC: 0.4 MG/DL (ref 0.2–1)
BILIRUB UR QL: NEGATIVE
BUN SERPL-MCNC: 12 MG/DL (ref 6–20)
BUN/CREAT SERPL: 15 (ref 12–20)
CALCIUM SERPL-MCNC: 9.5 MG/DL (ref 8.5–10.1)
CHLORIDE SERPL-SCNC: 109 MMOL/L (ref 97–108)
CO2 SERPL-SCNC: 33 MMOL/L (ref 21–32)
COLOR UR: ABNORMAL
CREAT SERPL-MCNC: 0.81 MG/DL (ref 0.55–1.02)
DIFFERENTIAL METHOD BLD: ABNORMAL
EOSINOPHIL # BLD: 0.1 K/UL (ref 0–0.4)
EOSINOPHIL NFR BLD: 1.8 % (ref 0–7)
EPITH CASTS URNS QL MICRO: ABNORMAL /LPF
ERYTHROCYTE [DISTWIDTH] IN BLOOD BY AUTOMATED COUNT: 13.3 % (ref 11.5–14.5)
GLOBULIN SER CALC-MCNC: 4.5 G/DL (ref 2–4)
GLUCOSE SERPL-MCNC: 82 MG/DL (ref 65–100)
GLUCOSE UR STRIP.AUTO-MCNC: NEGATIVE MG/DL
HCT VFR BLD AUTO: 40.7 % (ref 35–47)
HGB BLD-MCNC: 13.5 G/DL (ref 11.5–16)
HGB UR QL STRIP: NEGATIVE
IMM GRANULOCYTES # BLD AUTO: 0.01 K/UL (ref 0–0.04)
IMM GRANULOCYTES NFR BLD AUTO: 0.2 % (ref 0–0.5)
KETONES UR QL STRIP.AUTO: NEGATIVE MG/DL
LACTATE BLD-SCNC: 3.47 MMOL/L (ref 0.4–2)
LACTATE SERPL-SCNC: 1.3 MMOL/L (ref 0.4–2)
LEUKOCYTE ESTERASE UR QL STRIP.AUTO: ABNORMAL
LYMPHOCYTES # BLD: 2.42 K/UL (ref 0.8–3.5)
LYMPHOCYTES NFR BLD: 43.4 % (ref 12–49)
MCH RBC QN AUTO: 33 PG (ref 26–34)
MCHC RBC AUTO-ENTMCNC: 33.2 G/DL (ref 30–36.5)
MCV RBC AUTO: 99.5 FL (ref 80–99)
MONOCYTES # BLD: 0.48 K/UL (ref 0–1)
MONOCYTES NFR BLD: 8.6 % (ref 5–13)
NEUTS SEG # BLD: 2.52 K/UL (ref 1.8–8)
NEUTS SEG NFR BLD: 45.3 % (ref 32–75)
NITRITE UR QL STRIP.AUTO: NEGATIVE
NRBC # BLD: 0 K/UL (ref 0–0.01)
NRBC BLD-RTO: 0 PER 100 WBC
PH UR STRIP: 8 (ref 5–8)
PLATELET # BLD AUTO: 184 K/UL (ref 150–400)
PMV BLD AUTO: 11.3 FL (ref 8.9–12.9)
POTASSIUM SERPL-SCNC: 4.6 MMOL/L (ref 3.5–5.1)
PROT SERPL-MCNC: 7.7 G/DL (ref 6.4–8.2)
PROT UR STRIP-MCNC: NEGATIVE MG/DL
RBC # BLD AUTO: 4.09 M/UL (ref 3.8–5.2)
RBC #/AREA URNS HPF: ABNORMAL /HPF (ref 0–5)
SODIUM SERPL-SCNC: 147 MMOL/L (ref 136–145)
SP GR UR REFRACTOMETRY: 1.01 (ref 1–1.03)
TROPONIN I SERPL HS-MCNC: 7 NG/L (ref 0–51)
UROBILINOGEN UR QL STRIP.AUTO: 0.2 EU/DL (ref 0.2–1)
WBC # BLD AUTO: 5.6 K/UL (ref 3.6–11)
WBC URNS QL MICRO: ABNORMAL /HPF (ref 0–4)

## 2025-05-25 PROCEDURE — 84484 ASSAY OF TROPONIN QUANT: CPT

## 2025-05-25 PROCEDURE — 85025 COMPLETE CBC W/AUTO DIFF WBC: CPT

## 2025-05-25 PROCEDURE — 6360000002 HC RX W HCPCS: Performed by: EMERGENCY MEDICINE

## 2025-05-25 PROCEDURE — 2580000003 HC RX 258: Performed by: EMERGENCY MEDICINE

## 2025-05-25 PROCEDURE — 83605 ASSAY OF LACTIC ACID: CPT

## 2025-05-25 PROCEDURE — 70450 CT HEAD/BRAIN W/O DYE: CPT

## 2025-05-25 PROCEDURE — 2580000003 HC RX 258: Performed by: HOSPITALIST

## 2025-05-25 PROCEDURE — 99285 EMERGENCY DEPT VISIT HI MDM: CPT

## 2025-05-25 PROCEDURE — 93005 ELECTROCARDIOGRAM TRACING: CPT | Performed by: EMERGENCY MEDICINE

## 2025-05-25 PROCEDURE — 71045 X-RAY EXAM CHEST 1 VIEW: CPT

## 2025-05-25 PROCEDURE — 81001 URINALYSIS AUTO W/SCOPE: CPT

## 2025-05-25 PROCEDURE — 2060000000 HC ICU INTERMEDIATE R&B

## 2025-05-25 PROCEDURE — 87086 URINE CULTURE/COLONY COUNT: CPT

## 2025-05-25 PROCEDURE — 80053 COMPREHEN METABOLIC PANEL: CPT

## 2025-05-25 PROCEDURE — 87040 BLOOD CULTURE FOR BACTERIA: CPT

## 2025-05-25 PROCEDURE — 6370000000 HC RX 637 (ALT 250 FOR IP): Performed by: HOSPITALIST

## 2025-05-25 RX ORDER — SODIUM CHLORIDE 9 MG/ML
INJECTION, SOLUTION INTRAVENOUS PRN
Status: DISCONTINUED | OUTPATIENT
Start: 2025-05-25 | End: 2025-05-27 | Stop reason: HOSPADM

## 2025-05-25 RX ORDER — ONDANSETRON 2 MG/ML
4 INJECTION INTRAMUSCULAR; INTRAVENOUS EVERY 6 HOURS PRN
Status: DISCONTINUED | OUTPATIENT
Start: 2025-05-25 | End: 2025-05-27 | Stop reason: HOSPADM

## 2025-05-25 RX ORDER — ATORVASTATIN CALCIUM 20 MG/1
20 TABLET, FILM COATED ORAL DAILY
Status: DISCONTINUED | OUTPATIENT
Start: 2025-05-25 | End: 2025-05-27 | Stop reason: HOSPADM

## 2025-05-25 RX ORDER — LEVOFLOXACIN 5 MG/ML
750 INJECTION, SOLUTION INTRAVENOUS ONCE
Status: COMPLETED | OUTPATIENT
Start: 2025-05-25 | End: 2025-05-25

## 2025-05-25 RX ORDER — SODIUM CHLORIDE 0.9 % (FLUSH) 0.9 %
5-40 SYRINGE (ML) INJECTION EVERY 12 HOURS SCHEDULED
Status: DISCONTINUED | OUTPATIENT
Start: 2025-05-25 | End: 2025-05-27 | Stop reason: HOSPADM

## 2025-05-25 RX ORDER — LORAZEPAM 0.5 MG/1
0.5 TABLET ORAL EVERY 8 HOURS PRN
Status: DISCONTINUED | OUTPATIENT
Start: 2025-05-25 | End: 2025-05-27 | Stop reason: HOSPADM

## 2025-05-25 RX ORDER — PANTOPRAZOLE SODIUM 40 MG/1
40 TABLET, DELAYED RELEASE ORAL
Status: DISCONTINUED | OUTPATIENT
Start: 2025-05-26 | End: 2025-05-27 | Stop reason: HOSPADM

## 2025-05-25 RX ORDER — TRAZODONE HYDROCHLORIDE 50 MG/1
50 TABLET ORAL NIGHTLY
Status: DISCONTINUED | OUTPATIENT
Start: 2025-05-25 | End: 2025-05-25 | Stop reason: SDUPTHER

## 2025-05-25 RX ORDER — MIRTAZAPINE 15 MG/1
15 TABLET, FILM COATED ORAL NIGHTLY
Status: DISCONTINUED | OUTPATIENT
Start: 2025-05-25 | End: 2025-05-27 | Stop reason: HOSPADM

## 2025-05-25 RX ORDER — HALOPERIDOL 5 MG/ML
2 INJECTION INTRAMUSCULAR EVERY 6 HOURS PRN
Status: DISCONTINUED | OUTPATIENT
Start: 2025-05-25 | End: 2025-05-27 | Stop reason: HOSPADM

## 2025-05-25 RX ORDER — ACETAMINOPHEN 650 MG/1
650 SUPPOSITORY RECTAL EVERY 6 HOURS PRN
Status: DISCONTINUED | OUTPATIENT
Start: 2025-05-25 | End: 2025-05-27 | Stop reason: HOSPADM

## 2025-05-25 RX ORDER — TRAZODONE HYDROCHLORIDE 50 MG/1
50 TABLET ORAL NIGHTLY
Status: DISCONTINUED | OUTPATIENT
Start: 2025-05-25 | End: 2025-05-27 | Stop reason: HOSPADM

## 2025-05-25 RX ORDER — MIRTAZAPINE 15 MG/1
7.5 TABLET, FILM COATED ORAL NIGHTLY
Status: DISCONTINUED | OUTPATIENT
Start: 2025-05-25 | End: 2025-05-25

## 2025-05-25 RX ORDER — SODIUM CHLORIDE 0.9 % (FLUSH) 0.9 %
5-40 SYRINGE (ML) INJECTION PRN
Status: DISCONTINUED | OUTPATIENT
Start: 2025-05-25 | End: 2025-05-27 | Stop reason: HOSPADM

## 2025-05-25 RX ORDER — MEMANTINE HYDROCHLORIDE 5 MG/1
5 TABLET ORAL 2 TIMES DAILY
Status: DISCONTINUED | OUTPATIENT
Start: 2025-05-25 | End: 2025-05-25

## 2025-05-25 RX ORDER — 0.9 % SODIUM CHLORIDE 0.9 %
1000 INTRAVENOUS SOLUTION INTRAVENOUS ONCE
Status: COMPLETED | OUTPATIENT
Start: 2025-05-25 | End: 2025-05-25

## 2025-05-25 RX ORDER — MEMANTINE HYDROCHLORIDE 10 MG/1
10 TABLET ORAL 2 TIMES DAILY
Status: DISCONTINUED | OUTPATIENT
Start: 2025-05-25 | End: 2025-05-27 | Stop reason: HOSPADM

## 2025-05-25 RX ORDER — LEVOFLOXACIN 5 MG/ML
750 INJECTION, SOLUTION INTRAVENOUS EVERY 24 HOURS
Status: DISCONTINUED | OUTPATIENT
Start: 2025-05-26 | End: 2025-05-27

## 2025-05-25 RX ORDER — DIVALPROEX SODIUM 250 MG/1
250 TABLET, DELAYED RELEASE ORAL 2 TIMES DAILY
Status: DISCONTINUED | OUTPATIENT
Start: 2025-05-25 | End: 2025-05-25

## 2025-05-25 RX ORDER — DEXTROSE MONOHYDRATE AND SODIUM CHLORIDE 5; .45 G/100ML; G/100ML
INJECTION, SOLUTION INTRAVENOUS CONTINUOUS
Status: DISCONTINUED | OUTPATIENT
Start: 2025-05-25 | End: 2025-05-26

## 2025-05-25 RX ORDER — QUETIAPINE FUMARATE 25 MG/1
25 TABLET, FILM COATED ORAL
Status: DISCONTINUED | OUTPATIENT
Start: 2025-05-25 | End: 2025-05-27 | Stop reason: HOSPADM

## 2025-05-25 RX ORDER — POLYETHYLENE GLYCOL 3350 17 G/17G
17 POWDER, FOR SOLUTION ORAL DAILY PRN
Status: DISCONTINUED | OUTPATIENT
Start: 2025-05-25 | End: 2025-05-27 | Stop reason: HOSPADM

## 2025-05-25 RX ORDER — ACETAMINOPHEN 325 MG/1
650 TABLET ORAL EVERY 6 HOURS PRN
Status: DISCONTINUED | OUTPATIENT
Start: 2025-05-25 | End: 2025-05-27 | Stop reason: HOSPADM

## 2025-05-25 RX ORDER — DIVALPROEX SODIUM 250 MG/1
250 TABLET, DELAYED RELEASE ORAL EVERY 8 HOURS SCHEDULED
Status: DISCONTINUED | OUTPATIENT
Start: 2025-05-25 | End: 2025-05-26 | Stop reason: SDUPTHER

## 2025-05-25 RX ORDER — ONDANSETRON 4 MG/1
4 TABLET, ORALLY DISINTEGRATING ORAL EVERY 8 HOURS PRN
Status: DISCONTINUED | OUTPATIENT
Start: 2025-05-25 | End: 2025-05-27 | Stop reason: HOSPADM

## 2025-05-25 RX ADMIN — Medication 3 MG: at 22:22

## 2025-05-25 RX ADMIN — LEVOFLOXACIN 750 MG: 750 INJECTION, SOLUTION INTRAVENOUS at 09:55

## 2025-05-25 RX ADMIN — MIRTAZAPINE 15 MG: 15 TABLET, FILM COATED ORAL at 22:22

## 2025-05-25 RX ADMIN — DIVALPROEX SODIUM 250 MG: 250 TABLET, DELAYED RELEASE ORAL at 22:22

## 2025-05-25 RX ADMIN — ATORVASTATIN CALCIUM 20 MG: 20 TABLET, FILM COATED ORAL at 17:01

## 2025-05-25 RX ADMIN — SODIUM CHLORIDE 1000 ML: 0.9 INJECTION, SOLUTION INTRAVENOUS at 07:43

## 2025-05-25 RX ADMIN — MEMANTINE 10 MG: 10 TABLET ORAL at 22:22

## 2025-05-25 RX ADMIN — DEXTROSE AND SODIUM CHLORIDE: 5; .45 INJECTION, SOLUTION INTRAVENOUS at 17:06

## 2025-05-25 RX ADMIN — TRAZODONE HYDROCHLORIDE 50 MG: 50 TABLET ORAL at 22:22

## 2025-05-25 ASSESSMENT — PAIN DESCRIPTION - LOCATION: LOCATION: FACE

## 2025-05-25 ASSESSMENT — PAIN DESCRIPTION - DESCRIPTORS: DESCRIPTORS: ACHING

## 2025-05-25 ASSESSMENT — PAIN DESCRIPTION - ORIENTATION: ORIENTATION: ANTERIOR

## 2025-05-25 ASSESSMENT — PAIN - FUNCTIONAL ASSESSMENT
PAIN_FUNCTIONAL_ASSESSMENT: WONG-BAKER FACES
PAIN_FUNCTIONAL_ASSESSMENT: ACTIVITIES ARE NOT PREVENTED

## 2025-05-25 ASSESSMENT — PAIN DESCRIPTION - FREQUENCY: FREQUENCY: INTERMITTENT

## 2025-05-25 ASSESSMENT — PAIN DESCRIPTION - PAIN TYPE: TYPE: ACUTE PAIN

## 2025-05-25 ASSESSMENT — PAIN DESCRIPTION - ONSET: ONSET: ON-GOING

## 2025-05-25 ASSESSMENT — PAIN SCALES - WONG BAKER: WONGBAKER_NUMERICALRESPONSE: HURTS A LITTLE BIT

## 2025-05-25 NOTE — PROGRESS NOTES
4 Eyes Skin Assessment     NAME:  Rosana Thorne  YOB: 1960  MEDICAL RECORD NUMBER:  [id number]    The patient is being assessed for  Admission    I agree that at least one RN has performed a thorough Head to Toe Skin Assessment on the patient. ALL assessment sites listed below have been assessed.      Areas assessed by both nurses:    Head, Face, Ears, Shoulders, Back, Chest, Arms, Elbows, Hands, Sacrum. Buttock, Coccyx, Ischium, Legs. Feet and Heels, and Under Medical Devices         Does the Patient have a Wound? No noted wound(s)       [place]ylor Prevention initiated by RN: Yes  Wound Care Orders initiated by RN: No    Pressure Injury (Stage 3,4, Unstageable, DTI, NWPT, and Complex wounds) if present, place Wound referral order by RN under : No    New Ostomies, if present place, Ostomy referral order under : No     Nurse 1 eSignature: Electronically signed by Cici Fulton RN on 5/25/25 at 4:47 PM EDT    **SHARE this note so that the co-signing nurse can place an eSignature**    Nurse 2 eSignature: Electronically signed by Bryanna Cortez RN on 5/25/25 at 5:19 PM EDT

## 2025-05-25 NOTE — ED NOTES
TRANSFER - OUT REPORT:    Verbal report given to BROOKE Bolanos on Rosana Thorne  being transferred to [facility] [id number] for routine progression of patient care       Report consisted of patient's Situation, Background, Assessment and   Recommendations(SBAR).     Information from the following report(s) ED SBAR was reviewed with the receiving nurse.    [place] Fall Assessment:    Presents to emergency department  because of falls (Syncope, seizure, or loss of consciousness): Yes  Age > 70: No  Altered Mental Status, Intoxication with alcohol or substance confusion (Disorientation, impaired judgment, poor safety awaremess, or inability to follow instructions): Yes  Impaired Mobility: Ambulates or transfers with assistive devices or assistance; Unable to ambulate or transer.: Yes  Nursing Judgement: Yes          Lines:   Peripheral IV 05/25/25 Left Antecubital (Active)       Peripheral IV 05/25/25 Right Antecubital (Active)   Site Assessment Clean, dry & intact 05/25/25 0753   Line Status Blood return noted;Normal saline locked;Specimen collected;Flushed 05/25/25 0753   Line Care Connections checked and tightened;Line pulled back 05/25/25 0753   Phlebitis Assessment No symptoms 05/25/25 0753   Alcohol Cap Used No 05/25/25 0753   Dressing Status New dressing applied;Clean, dry & intact 05/25/25 0753   Dressing Type Transparent 05/25/25 0753   Dressing Intervention New 05/25/25 0753        Opportunity for questions and clarification was provided.      Patient transported with:  Monitor

## 2025-05-25 NOTE — H&P
History and Physical    Date of Service:  5/25/2025  Primary Care Provider: Ana Armendariz MD  Source of information: The patient, Chart review, and Friend on the phone     Chief Complaint: Fall, Hematoma, and Epistaxis this morning      History of Presenting Illness:   Rosana Thorne is a 65 y.o. female with PMH of Alzheimer dementia, Mood disorder, Dyslipidemia, and GERD who is directly admitted [facility] after she presented with fall, right side frontal hematoma and epistaxis about 6:30 am. Patient is a poor historian, answered very simple question but unable to obtained detail information from her. Nos bleeding had stopped. Per her friend on the phone, he stated that he was called at 6:30 am from [facility]'s staff after she had an unwitnessed fall, face down and bleeding form her right nose. She has also bruise and swelling on her right side forehead. No reported seizure activity. She walks independently at base line. No history of heart disease, DM, HTN, CKD or arrhythmia. She has difficulty on holding utensil especially when she eats.    The patient denies any headache, blurry vision, sore throat, trouble swallowing, trouble with speech, chest pain, SOB, cough, fever, chills, N/V/D, or abd pain.      Work up at [facility];  V/S BP 11/83, P 80, RR 18, T 97.3, SpO2 99  CT head impression  1. No intracranial hemorrhage.  2. Cerebral volume loss and ventricular enlargement.  3. Right maxillary sinusitis.  4. Probable cartilage fracture in the nose. No osseous fracture.  Chest x ray No acute process on portable chest. New osteopenia.  Lactic acid 3.74,   UA wbc 5-10, leukocyte esterase trace  She received IVF, Levaquin and transferred to [facility]       REVIEW OF SYSTEMS:  Review of systems not obtained due to patient factors.     Past Medical History:   Diagnosis Date    Abnormal Pap smear of cervix 2003    Allergic rhinitis, cause unspecified     Dr Isaac

## 2025-05-25 NOTE — ED NOTES
Discussed admission with family - Leela Ediss [phone], he is aware of transfer. Asked to be notified when transferred and room number.

## 2025-05-25 NOTE — ED NOTES
SBAR report received from BROOKE Guillaume. Mrs. Thorne is in front of the nursing station for obs with bed alarm on, SR up. She Alert but pleasantly confused and cooperative. On the cardiac monitor. NS and Levaquin infusing. Nose bleed has ceased, no bleeding noted. Awaiting in patient bed. She is non-verbal at times.

## 2025-05-25 NOTE — ED TRIAGE NOTES
[facility] Nursing Note        Patient Name: Rosana Thorne      : 1960             MRN: [id number]      Chief Complaint:  Fall, Hematoma, and Epistaxis      Admit Diagnosis: No admission diagnoses are documented for this encounter.      Admitting Provider: No admitting provider for patient encounter.      Surgery: * No surgery found *           Patient arrived to the ER via EMS from [facility] with complaints of an Unwitnessed GLF that happened this morning sustaining a Forehead Hematoma, and a Nose Bleed (stopped bleeding). Pt's medication list reviewed and is not taking any blood thinners. Pt has a DNR on file.         Lines:        Signed by: Reynaldo Rivera RN, LIT, BSN, CMSRN                                              2025 at 6:40 AM

## 2025-05-25 NOTE — ED NOTES
2N has been assigned, waiting for bed to be cleaned. Mrs. Thorne remains confused, but able to redirect and calm with restless. No acute changes noted.

## 2025-05-25 NOTE — ED NOTES
[facility] Nursing Note        Patient Name: Rosana Thorne      : 1960             MRN: [id number]      Chief Complaint:  Fall, Hematoma, and Epistaxis      Admit Diagnosis: No admission diagnoses are documented for this encounter.      Admitting Provider: No admitting provider for patient encounter.      Surgery: * No surgery found *           Patient placed in a Bed Alarm and Fall Bracelet.         Lines:        Signed by: Reynaldo Rivera RN, LIT, BSN, CMSRN                                              2025 at 6:58 AM

## 2025-05-26 LAB
ALBUMIN SERPL-MCNC: 3 G/DL (ref 3.5–5)
ALBUMIN/GLOB SERPL: 0.8 (ref 1.1–2.2)
ALP SERPL-CCNC: 76 U/L (ref 45–117)
ALT SERPL-CCNC: 14 U/L (ref 12–78)
ANION GAP SERPL CALC-SCNC: 6 MMOL/L (ref 2–12)
AST SERPL-CCNC: 20 U/L (ref 15–37)
BACTERIA SPEC CULT: ABNORMAL
BASOPHILS # BLD: 0.02 K/UL (ref 0–0.1)
BASOPHILS NFR BLD: 0.4 % (ref 0–1)
BILIRUB SERPL-MCNC: 0.4 MG/DL (ref 0.2–1)
BUN SERPL-MCNC: 7 MG/DL (ref 6–20)
BUN/CREAT SERPL: 11 (ref 12–20)
CALCIUM SERPL-MCNC: 9.3 MG/DL (ref 8.5–10.1)
CC UR VC: ABNORMAL
CHLORIDE SERPL-SCNC: 112 MMOL/L (ref 97–108)
CO2 SERPL-SCNC: 25 MMOL/L (ref 21–32)
CREAT SERPL-MCNC: 0.66 MG/DL (ref 0.55–1.02)
DIFFERENTIAL METHOD BLD: ABNORMAL
EOSINOPHIL # BLD: 0.07 K/UL (ref 0–0.4)
EOSINOPHIL NFR BLD: 1.3 % (ref 0–7)
ERYTHROCYTE [DISTWIDTH] IN BLOOD BY AUTOMATED COUNT: 13.1 % (ref 11.5–14.5)
GLOBULIN SER CALC-MCNC: 4 G/DL (ref 2–4)
GLUCOSE SERPL-MCNC: 103 MG/DL (ref 65–100)
HCT VFR BLD AUTO: 43.4 % (ref 35–47)
HGB BLD-MCNC: 13.4 G/DL (ref 11.5–16)
IMM GRANULOCYTES # BLD AUTO: 0.01 K/UL (ref 0–0.04)
IMM GRANULOCYTES NFR BLD AUTO: 0.2 % (ref 0–0.5)
LYMPHOCYTES # BLD: 2.18 K/UL (ref 0.8–3.5)
LYMPHOCYTES NFR BLD: 40.5 % (ref 12–49)
MCH RBC QN AUTO: 31.8 PG (ref 26–34)
MCHC RBC AUTO-ENTMCNC: 30.9 G/DL (ref 30–36.5)
MCV RBC AUTO: 103.1 FL (ref 80–99)
MONOCYTES # BLD: 0.55 K/UL (ref 0–1)
MONOCYTES NFR BLD: 10.2 % (ref 5–13)
NEUTS SEG # BLD: 2.55 K/UL (ref 1.8–8)
NEUTS SEG NFR BLD: 47.4 % (ref 32–75)
NRBC # BLD: 0 K/UL (ref 0–0.01)
NRBC BLD-RTO: 0 PER 100 WBC
PHOSPHATE SERPL-MCNC: 3.9 MG/DL (ref 2.6–4.7)
PLATELET # BLD AUTO: 180 K/UL (ref 150–400)
PMV BLD AUTO: 11.3 FL (ref 8.9–12.9)
POTASSIUM SERPL-SCNC: 3.4 MMOL/L (ref 3.5–5.1)
PROCALCITONIN SERPL-MCNC: <0.05 NG/ML
PROT SERPL-MCNC: 7 G/DL (ref 6.4–8.2)
RBC # BLD AUTO: 4.21 M/UL (ref 3.8–5.2)
SERVICE CMNT-IMP: ABNORMAL
SODIUM SERPL-SCNC: 143 MMOL/L (ref 136–145)
WBC # BLD AUTO: 5.4 K/UL (ref 3.6–11)

## 2025-05-26 PROCEDURE — 80053 COMPREHEN METABOLIC PANEL: CPT

## 2025-05-26 PROCEDURE — 6360000002 HC RX W HCPCS: Performed by: HOSPITALIST

## 2025-05-26 PROCEDURE — 97161 PT EVAL LOW COMPLEX 20 MIN: CPT

## 2025-05-26 PROCEDURE — 84100 ASSAY OF PHOSPHORUS: CPT

## 2025-05-26 PROCEDURE — 2500000003 HC RX 250 WO HCPCS: Performed by: HOSPITALIST

## 2025-05-26 PROCEDURE — 1100000000 HC RM PRIVATE

## 2025-05-26 PROCEDURE — 97166 OT EVAL MOD COMPLEX 45 MIN: CPT

## 2025-05-26 PROCEDURE — 6370000000 HC RX 637 (ALT 250 FOR IP): Performed by: HOSPITALIST

## 2025-05-26 PROCEDURE — 85025 COMPLETE CBC W/AUTO DIFF WBC: CPT

## 2025-05-26 PROCEDURE — 84145 PROCALCITONIN (PCT): CPT

## 2025-05-26 PROCEDURE — 2580000003 HC RX 258: Performed by: HOSPITALIST

## 2025-05-26 PROCEDURE — 6370000000 HC RX 637 (ALT 250 FOR IP): Performed by: OTOLARYNGOLOGY

## 2025-05-26 RX ORDER — DIVALPROEX SODIUM 125 MG/1
250 CAPSULE, COATED PELLETS ORAL EVERY 8 HOURS SCHEDULED
Status: DISCONTINUED | OUTPATIENT
Start: 2025-05-26 | End: 2025-05-27 | Stop reason: HOSPADM

## 2025-05-26 RX ORDER — PETROLATUM,WHITE
OINTMENT IN PACKET (GRAM) TOPICAL 2 TIMES DAILY
Status: DISCONTINUED | OUTPATIENT
Start: 2025-05-26 | End: 2025-05-27 | Stop reason: HOSPADM

## 2025-05-26 RX ORDER — SODIUM CHLORIDE 9 MG/ML
INJECTION, SOLUTION INTRAVENOUS CONTINUOUS
Status: DISCONTINUED | OUTPATIENT
Start: 2025-05-26 | End: 2025-05-27

## 2025-05-26 RX ORDER — POTASSIUM CHLORIDE 750 MG/1
40 TABLET, EXTENDED RELEASE ORAL ONCE
Status: COMPLETED | OUTPATIENT
Start: 2025-05-26 | End: 2025-05-26

## 2025-05-26 RX ORDER — POTASSIUM CHLORIDE 750 MG/1
40 TABLET, EXTENDED RELEASE ORAL ONCE
Status: DISCONTINUED | OUTPATIENT
Start: 2025-05-26 | End: 2025-05-26

## 2025-05-26 RX ADMIN — POTASSIUM CHLORIDE 40 MEQ: 750 TABLET, FILM COATED, EXTENDED RELEASE ORAL at 09:00

## 2025-05-26 RX ADMIN — DIVALPROEX SODIUM 250 MG: 125 CAPSULE ORAL at 14:29

## 2025-05-26 RX ADMIN — MEMANTINE 10 MG: 10 TABLET ORAL at 08:59

## 2025-05-26 RX ADMIN — WHITE PETROLATUM: 1 JELLY TOPICAL at 09:35

## 2025-05-26 RX ADMIN — SODIUM CHLORIDE, PRESERVATIVE FREE 10 ML: 5 INJECTION INTRAVENOUS at 09:01

## 2025-05-26 RX ADMIN — PANTOPRAZOLE SODIUM 40 MG: 40 TABLET, DELAYED RELEASE ORAL at 06:12

## 2025-05-26 RX ADMIN — LEVOFLOXACIN 750 MG: 5 INJECTION, SOLUTION INTRAVENOUS at 09:40

## 2025-05-26 RX ADMIN — SODIUM CHLORIDE: 0.9 INJECTION, SOLUTION INTRAVENOUS at 14:19

## 2025-05-26 RX ADMIN — ATORVASTATIN CALCIUM 20 MG: 20 TABLET, FILM COATED ORAL at 08:59

## 2025-05-26 RX ADMIN — SODIUM CHLORIDE, PRESERVATIVE FREE 10 ML: 5 INJECTION INTRAVENOUS at 21:27

## 2025-05-26 RX ADMIN — Medication 3 MG: at 21:19

## 2025-05-26 RX ADMIN — DIVALPROEX SODIUM 250 MG: 125 CAPSULE ORAL at 21:19

## 2025-05-26 RX ADMIN — DIVALPROEX SODIUM 250 MG: 250 TABLET, DELAYED RELEASE ORAL at 06:11

## 2025-05-26 RX ADMIN — MEMANTINE 10 MG: 10 TABLET ORAL at 21:19

## 2025-05-26 RX ADMIN — TRAZODONE HYDROCHLORIDE 50 MG: 50 TABLET ORAL at 21:19

## 2025-05-26 RX ADMIN — WHITE PETROLATUM: 1 JELLY TOPICAL at 21:27

## 2025-05-26 RX ADMIN — MIRTAZAPINE 15 MG: 15 TABLET, FILM COATED ORAL at 21:19

## 2025-05-26 RX ADMIN — DEXTROSE AND SODIUM CHLORIDE: 5; .45 INJECTION, SOLUTION INTRAVENOUS at 06:29

## 2025-05-26 RX ADMIN — LORAZEPAM 0.5 MG: 0.5 TABLET ORAL at 15:46

## 2025-05-26 ASSESSMENT — PAIN SCALES - WONG BAKER: WONGBAKER_NUMERICALRESPONSE: NO HURT

## 2025-05-26 NOTE — CONSULTS
Former     Current packs/day: 0.00     Types: Cigarettes     Quit date: 10/1/1987     Years since quittin.6    Smokeless tobacco: Never   Substance Use Topics    Alcohol use: Yes     Alcohol/week: 2.5 - 3.3 standard drinks of alcohol    Drug use: No         REVIEW OF SYSTEMS  A full 10 point review of systems was performed today. The review was non-pertinent other than the details already listed in the history of present illness.     BP (!) 134/91   Pulse 79   Temp 98.2 °F (36.8 °C) (Oral)   Resp 18   Wt 68.7 kg (151 lb 7.3 oz)   SpO2 99%   BMI 23.72 kg/m²      PHYSICAL EXAM  General:  No acute distress. Alert and oriented x 3.   MSK:   Normal muscle bulk  Psych:  Mood and affect appropriate.   Neuro:  CN II - XII grossly intact bilaterally.   Eyes:  PERRL/EOMI, no nystagmus.   ENT:   Nasal bones and cartilage midline without skin disruption.  Septum midline. No hematoma or bleeding  Lymph:  Neck soft and supple without lymphadenopathy.   Resp:   No audible stridor or wheezing.   Skin:   Head and neck skin is without suspicious lesions.    DATA REVIEW:  CT head personally reviewed:  no fracture    ASSESSMENT/PLAN:  65 y.o. F with nasal trauma resulting in epistaxis that was self limited.  No fracture or need for surgery.  Consider vaseline to nostrils to help loosen the old bloody crusts in her nostril hairs.  Signing off.  No need for f/u with me.    Brian Esparza MD  formerly [facility]  [address], [address]  [place] [address]   (o) [phone]  (f) [phone]

## 2025-05-26 NOTE — PLAN OF CARE
Problem: Occupational Therapy - Adult  Goal: By Discharge: Performs self-care activities at highest level of function for planned discharge setting.  See evaluation for individualized goals.  Description: FUNCTIONAL STATUS PRIOR TO ADMISSION:  Patient admitted from [facility], unable to provide PLOF with no family in room. Per CM, she requires assist for ADLs and all IADLs, IND for mobility.    Occupational Therapy Goals:  Initiated 5/26/2025  1.  Patient will perform 1 simple grooming task with Moderate Assist within 7 day(s).  2.  Patient will perform upper body dressing with Moderate Assist within 7 day(s).  3.  Patient will perform self-feeding with Minimal Assist within 7 day(s).  4.  Patient will perform toilet transfers with Minimal Assist  within 7 day(s).    Outcome: Progressing     OCCUPATIONAL THERAPY EVALUATION    Patient: Rosana Thorne (65 y.o. female)  Date: 5/26/2025  Primary Diagnosis: UTI (urinary tract infection) [N39.0]  Acute cystitis without hematuria [N30.00]  Elevated lactic acid level [R79.89]  Fall, initial encounter [W19.XXXA]         Precautions: Fall Risk (SBP <180)                  ASSESSMENT :  The patient is limited by decreased functional mobility, independence in ADLs, high-level IADLs, ROM, strength, body mechanics, endurance, cognition (orientation, memory, safety awareness, command following, attention/concentration, initiation, and sequencing), coordination, and balance.     Patient currently requires Mod-Total A for all BADLs and functional mobility with intermittent HHA s/p to admission for UTI and fall with nasal contusion and epistaxis. Patient unable to provide PLOF, see above for details in chart at a memory care facility d/t Alzheimer's dementia. She required constant Min-Mod A for standing balance with intermittent posterior lean, MAX cues for sequencing, simple 1-step command following, and redirection to task. Highly distractible with some limitations in

## 2025-05-26 NOTE — PLAN OF CARE
Problem: Discharge Planning  Goal: Discharge to home or other facility with appropriate resources  Outcome: Progressing     Problem: Safety - Adult  Goal: Free from fall injury  Outcome: Progressing     Problem: Skin/Tissue Integrity  Goal: Skin integrity remains intact  Description: 1.  Monitor for areas of redness and/or skin breakdown2.  Assess vascular access sites hourly3.  Every 4-6 hours minimum:  Change oxygen saturation probe site4.  Every 4-6 hours:  If on nasal continuous positive airway pressure, respiratory therapy assess nares and determine need for appliance change or resting period  Outcome: Progressing

## 2025-05-26 NOTE — CARE COORDINATION
Care Management Initial Assessment       RUR:  10% Low Risk  Readmission? No  1st IM letter given? Yes - 5/26/2025  1st  letter given: No       05/26/25 1446   Service Assessment   Patient Orientation Person   Cognition Short Term Memory Deficit   History Provided By Significant Other   Primary Caregiver Self   Support Systems Spouse/Significant Other;Family Members   Patient's Healthcare Decision Maker is: Legal Next of Kin   PCP Verified by CM Yes   Last Visit to PCP Within last 3 months   Prior Functional Level Assistance with the following:;Bathing;Dressing;Toileting;Cooking;Housework;Shopping;Mobility   Current Functional Level Assistance with the following:;Bathing;Dressing;Toileting;Cooking;Housework;Shopping;Mobility   Can patient return to prior living arrangement Yes   Ability to make needs known: Good   Family able to assist with home care needs: No   Would you like for me to discuss the discharge plan with any other family members/significant others, and if so, who? Yes   Financial Resources Medicare   Social/Functional History   Lives With Other (Comment)  ([facility])   Type of Home Assisted living   Home Layout Multi-level   Home Access Elevator;Level entry   Bathroom Shower/Tub Walk-in shower   Bathroom Toilet Handicap height   Bathroom Accessibility Wheelchair accessible   Home Equipment None   Receives Help From Family   Prior Level of Assist for ADLs Needs assistance   Bath Dependent/Total   Dressing Dependent/Total   Grooming Dependent/Total   Feeding Modified independent    Toileting Needs assistance   Prior Level of Assist for Homemaking Needs assistance   Meal Prep Total   Laundry Total   Vacuuming Total   Cleaning Total   Gardening Total   Yard Work Total   Driving Total   Shopping Total   Homemaking Responsibilities No   Ambulation Assistance Independent   Prior Level of Assist for Transfers Independent   Active  No   Patient's  Info Mr. Ya

## 2025-05-26 NOTE — PROGRESS NOTES
[facility]                                                                                          Hospitalist Progress Note  Echo Hutchins MD  Answering service: [phone] OR [phone] from in house phone        Date of Service:  2025  NAME:  Rosana Thorne  :  1960  MRN:  [id number]       Admission Summary:     Rosana Thorne is a 65 y.o. female with PMH of Alzheimer dementia, Mood disorder, Dyslipidemia, and GERD who is [facility] after she presented with fall, right side frontal hematoma and epistaxis about 6:30 am. Patient is a poor historian, answered very simple question, unable to obtained detail information from her. On exam her nos bleeding had stopped. Per her friend on the phone, he stated that he was called at 6:30 am from [facility]'s staff after she had an unwitnessed fall, face down and bleeding form her right nose. She has also bruise and swelling on her right side forehead. No reported seizure activity. She walks independently at base line. No history of heart disease, DM, HTN, CKD or arrhythmia. She has difficulty on holding utensil especially when she eats.     The patient denies any headache, blurry vision, sore throat, trouble swallowing, trouble with speech, chest pain, SOB, cough, fever, chills, N/V/D, or abd pain.       [facility];  V/S BP 11/83, P 80, RR 18, T 97.3, SpO2 99  CT head impression  1. No intracranial hemorrhage.  2. Cerebral volume loss and ventricular enlargement.  3. Right maxillary sinusitis.  4. Probable cartilage fracture in the nose. No osseous fracture.  Chest x ray No acute process on portable chest. New osteopenia.  Lactic acid 3.74,   UA wbc 5-10, leukocyte esterase trace  She received IVF, Levaquin and transferred to [facility]       Interval history / Subjective:     Patient seen and examined at the bedside. Patient conscious and alert

## 2025-05-26 NOTE — PLAN OF CARE
Problem: Physical Therapy - Adult  Goal: By Discharge: Performs mobility at highest level of function for planned discharge setting.  See evaluation for individualized goals.  Description: FUNCTIONAL STATUS PRIOR TO ADMISSION: ambulating at [facility]    HOME SUPPORT PRIOR TO ADMISSION: lives at facility    Physical Therapy Goals  Initiated 5/26/2025  1.  Patient will move from supine to sit and sit to supine, scoot up and down, and roll side to side in bed with contact guard assist within 7 day(s).    2.  Patient will perform sit to stand with contact guard assist within 7 day(s).  3.  Patient will transfer from bed to chair and chair to bed with contact guard assist using the least restrictive device within 7 day(s).  4.  Patient will ambulate with contact guard assist for 50 feet with the least restrictive device within 7 day(s).     Outcome: Not Progressing     PHYSICAL THERAPY EVALUATION    Patient: Rosana Thorne (65 y.o. female)  Date: 5/26/2025  Primary Diagnosis: UTI (urinary tract infection) [N39.0]  Acute cystitis without hematuria [N30.00]  Elevated lactic acid level [R79.89]  Fall, initial encounter [W19.XXXA]       Precautions: Restrictions/Precautions  Restrictions/Precautions: Fall Risk (SBP <180)            ASSESSMENT : Patient is a 65 y.o. female with PMH of Alzheimer dementia admitted after fall with right side frontal hematoma and epistaxis   DEFICITS/IMPAIRMENTS:   The patient is limited by decreased functional mobility, safety awareness, cognition, command following, attention/concentration who could possibly benefit from PT to address these impairments. Patient ambulates at her memory care per chart. Patient not following commands, unable to form coherent sentences - but would perform some functional activities at times with coaxing. Patient limited by cognitive deficits.    Functional Outcome Measure:  The patient scored 7/24 on the [facility] outcome measure   Cutoff score <=171,2,3 had

## 2025-05-26 NOTE — PROGRESS NOTES
Spiritual Health History and Assessment/Progress Note  [facility]    Rituals, Rites and Sacraments,  ,  ,      Name: Rosana Thorne MRN: [id number]    Age: 65 y.o.     Sex: female   Language: English   Amish: Samaritan   UTI (urinary tract infection)     Date: 5/26/2025            Total Time Calculated: 5 min              Spiritual Assessment began in [facility] MED SURG        Referral/Consult From: Clergy/   Encounter Overview/Reason: Rituals, Rites and Sacraments  Service Provided For: Patient    Nila, Belief, Meaning:   Patient is connected with a nila tradition or spiritual practice  Family/Friends are connected with a nila tradition or spiritual practice      Importance and Influence:  Patient has spiritual/personal beliefs that influence decisions regarding their health  Family/Friends have spiritual/personal beliefs that influence decisions regarding the patient's health    Community:  Patient is connected with a spiritual community  Family/Friends are connected with a spiritual community:    Assessment and Plan of Care:     Patient Interventions include: Provided sacramental/Religion ritual  Family/Friends Interventions include: Provided sacramental/Religion ritual    Patient Plan of Care: Spiritual Care available upon further referral  Family/Friends Plan of Care: Spiritual Care available upon further referral    Electronically signed by Chaplain ESAU on 5/26/2025 at 12:25 PM     [facility]  Mrs. Thorne was in  bed. Her  was visiting. Mrs. Thorne did not speak but mouthed the prayers as the  recited them.  Couple received communion.  Assured them of continued prayer for her well-being     Sr. CHICHI Garsia, RN, ACSW, LCSW   Page:  [phone])

## 2025-05-27 VITALS
SYSTOLIC BLOOD PRESSURE: 119 MMHG | WEIGHT: 151.46 LBS | DIASTOLIC BLOOD PRESSURE: 65 MMHG | BODY MASS INDEX: 23.72 KG/M2 | RESPIRATION RATE: 20 BRPM | TEMPERATURE: 98.2 F | OXYGEN SATURATION: 95 % | HEART RATE: 66 BPM

## 2025-05-27 LAB
ALBUMIN SERPL-MCNC: 2.7 G/DL (ref 3.5–5)
ALBUMIN/GLOB SERPL: 0.7 (ref 1.1–2.2)
ALP SERPL-CCNC: 69 U/L (ref 45–117)
ALT SERPL-CCNC: 15 U/L (ref 12–78)
ANION GAP SERPL CALC-SCNC: 6 MMOL/L (ref 2–12)
AST SERPL-CCNC: 29 U/L (ref 15–37)
BASOPHILS # BLD: 0.03 K/UL (ref 0–0.1)
BASOPHILS NFR BLD: 0.5 % (ref 0–1)
BILIRUB SERPL-MCNC: 0.3 MG/DL (ref 0.2–1)
BUN SERPL-MCNC: 10 MG/DL (ref 6–20)
BUN/CREAT SERPL: 13 (ref 12–20)
CALCIUM SERPL-MCNC: 8.7 MG/DL (ref 8.5–10.1)
CHLORIDE SERPL-SCNC: 117 MMOL/L (ref 97–108)
CO2 SERPL-SCNC: 22 MMOL/L (ref 21–32)
CREAT SERPL-MCNC: 0.78 MG/DL (ref 0.55–1.02)
DIFFERENTIAL METHOD BLD: ABNORMAL
EKG DIAGNOSIS: NORMAL
EKG Q-T INTERVAL: 404 MS
EKG QRS DURATION: 94 MS
EKG QTC CALCULATION (BAZETT): 451 MS
EKG R AXIS: 68 DEGREES
EKG T AXIS: 23 DEGREES
EKG VENTRICULAR RATE: 75 BPM
EOSINOPHIL # BLD: 0.1 K/UL (ref 0–0.4)
EOSINOPHIL NFR BLD: 1.6 % (ref 0–7)
ERYTHROCYTE [DISTWIDTH] IN BLOOD BY AUTOMATED COUNT: 13.2 % (ref 11.5–14.5)
GLOBULIN SER CALC-MCNC: 3.7 G/DL (ref 2–4)
GLUCOSE SERPL-MCNC: 82 MG/DL (ref 65–100)
HCT VFR BLD AUTO: 38.3 % (ref 35–47)
HGB BLD-MCNC: 12.5 G/DL (ref 11.5–16)
IMM GRANULOCYTES # BLD AUTO: 0.01 K/UL (ref 0–0.04)
IMM GRANULOCYTES NFR BLD AUTO: 0.2 % (ref 0–0.5)
LYMPHOCYTES # BLD: 3.17 K/UL (ref 0.8–3.5)
LYMPHOCYTES NFR BLD: 49.5 % (ref 12–49)
MCH RBC QN AUTO: 32.6 PG (ref 26–34)
MCHC RBC AUTO-ENTMCNC: 32.6 G/DL (ref 30–36.5)
MCV RBC AUTO: 100 FL (ref 80–99)
MONOCYTES # BLD: 0.6 K/UL (ref 0–1)
MONOCYTES NFR BLD: 9.4 % (ref 5–13)
NEUTS SEG # BLD: 2.5 K/UL (ref 1.8–8)
NEUTS SEG NFR BLD: 38.8 % (ref 32–75)
NRBC # BLD: 0 K/UL (ref 0–0.01)
NRBC BLD-RTO: 0 PER 100 WBC
PLATELET # BLD AUTO: 184 K/UL (ref 150–400)
PMV BLD AUTO: 10.9 FL (ref 8.9–12.9)
POTASSIUM SERPL-SCNC: 4.6 MMOL/L (ref 3.5–5.1)
PROT SERPL-MCNC: 6.4 G/DL (ref 6.4–8.2)
RBC # BLD AUTO: 3.83 M/UL (ref 3.8–5.2)
SODIUM SERPL-SCNC: 145 MMOL/L (ref 136–145)
WBC # BLD AUTO: 6.4 K/UL (ref 3.6–11)

## 2025-05-27 PROCEDURE — 2580000003 HC RX 258: Performed by: HOSPITALIST

## 2025-05-27 PROCEDURE — 93010 ELECTROCARDIOGRAM REPORT: CPT | Performed by: SPECIALIST

## 2025-05-27 PROCEDURE — 6370000000 HC RX 637 (ALT 250 FOR IP): Performed by: HOSPITALIST

## 2025-05-27 PROCEDURE — 85025 COMPLETE CBC W/AUTO DIFF WBC: CPT

## 2025-05-27 PROCEDURE — 2500000003 HC RX 250 WO HCPCS: Performed by: HOSPITALIST

## 2025-05-27 PROCEDURE — 6360000002 HC RX W HCPCS: Performed by: HOSPITALIST

## 2025-05-27 PROCEDURE — 80053 COMPREHEN METABOLIC PANEL: CPT

## 2025-05-27 RX ORDER — LEVOFLOXACIN 5 MG/ML
750 INJECTION, SOLUTION INTRAVENOUS ONCE
Status: COMPLETED | OUTPATIENT
Start: 2025-05-27 | End: 2025-05-27

## 2025-05-27 RX ORDER — DIVALPROEX SODIUM 125 MG/1
250 CAPSULE, COATED PELLETS ORAL EVERY 8 HOURS SCHEDULED
Qty: 60 CAPSULE | Refills: 1 | Status: SHIPPED
Start: 2025-05-27

## 2025-05-27 RX ADMIN — SODIUM CHLORIDE: 0.9 INJECTION, SOLUTION INTRAVENOUS at 04:32

## 2025-05-27 RX ADMIN — LEVOFLOXACIN 750 MG: 750 INJECTION, SOLUTION INTRAVENOUS at 11:10

## 2025-05-27 RX ADMIN — ATORVASTATIN CALCIUM 20 MG: 20 TABLET, FILM COATED ORAL at 11:04

## 2025-05-27 RX ADMIN — PANTOPRAZOLE SODIUM 40 MG: 40 TABLET, DELAYED RELEASE ORAL at 07:04

## 2025-05-27 RX ADMIN — DIVALPROEX SODIUM 250 MG: 125 CAPSULE ORAL at 07:04

## 2025-05-27 RX ADMIN — MEMANTINE 10 MG: 10 TABLET ORAL at 11:04

## 2025-05-27 RX ADMIN — LORAZEPAM 0.5 MG: 0.5 TABLET ORAL at 07:04

## 2025-05-27 RX ADMIN — WHITE PETROLATUM: 1 JELLY TOPICAL at 11:24

## 2025-05-27 RX ADMIN — SODIUM CHLORIDE, PRESERVATIVE FREE 10 ML: 5 INJECTION INTRAVENOUS at 11:07

## 2025-05-27 NOTE — CARE COORDINATION
Transition of Care Plan:    RUR: 12% Low   Prior Level of Functioning: Dependent   Disposition: [facility]   LÓPEZ: Tues. 5/27  Follow up appointments: PCP  DME needed: Has needed DME  Transportation at discharge: [facility] stretcher transport today, Tues. 5/27 @ 1PM  IM/IMM Medicare/ letter given: 1st IM: 5/25  Is patient a Success and connected with [facility]? NA   Caregiver Contact: Spouse, Nilesh Ya, [phone]  Discharge Caregiver contacted prior to discharge? Yes  Care Conference needed? No  Barriers to discharge: None    11:00AM - CM reviewed chart. Per review and ID rounds, plan for patient to return to [facility]. CM called and spoke with Alejandrian [facility] staff (p: [phone]); patient able to return today, Tues. 5/27. CM faxed clinicals (f: [phone]). [facility] stretcher transport arranged for today, Tues. 5/27 @ 1PM. Attending made aware via [employer]. Discharge packet placed on patient's hard chart. Nursing to call report to [phone]. Spouse and patient made aware at bedside; both remain in agreement. No further CM needs.     ADIEL Reynolds   [phone]

## 2025-05-27 NOTE — PLAN OF CARE
Problem: Discharge Planning  Goal: Discharge to home or other facility with appropriate resources  5/27/2025 1140 by Elsie Aleman RN  Outcome: Progressing  5/26/2025 2142 by Alex Leigh RN  Outcome: Progressing     Problem: Safety - Adult  Goal: Free from fall injury  5/27/2025 1140 by Elsie Aleman, RN  Outcome: Progressing  5/26/2025 2142 by Alex Leigh, RN  Outcome: Progressing     Problem: Skin/Tissue Integrity  Goal: Skin integrity remains intact  Description: 1.  Monitor for areas of redness and/or skin breakdown2.  Assess vascular access sites hourly3.  Every 4-6 hours minimum:  Change oxygen saturation probe site4.  Every 4-6 hours:  If on nasal continuous positive airway pressure, respiratory therapy assess nares and determine need for appliance change or resting period  5/27/2025 1140 by Elsie Aleman, RN  Outcome: Progressing  5/26/2025 2142 by Alex Leigh RN  Outcome: Progressing     Problem: Pain  Goal: Verbalizes/displays adequate comfort level or baseline comfort level  5/27/2025 1140 by Elsie Aleman, RN  Outcome: Progressing  5/26/2025 2142 by Alex Leigh RN  Outcome: Progressing

## 2025-05-27 NOTE — PLAN OF CARE
Problem: Safety - Adult  Goal: Free from fall injury  Outcome: Progressing     Problem: Physical Therapy - Adult  Goal: By Discharge: Performs mobility at highest level of function for planned discharge setting.  See evaluation for individualized goals.  Description: FUNCTIONAL STATUS PRIOR TO ADMISSION: ambulating at [facility]    HOME SUPPORT PRIOR TO ADMISSION: lives at facility    Physical Therapy Goals  Initiated 5/26/2025  1.  Patient will move from supine to sit and sit to supine, scoot up and down, and roll side to side in bed with contact guard assist within 7 day(s).    2.  Patient will perform sit to stand with contact guard assist within 7 day(s).  3.  Patient will transfer from bed to chair and chair to bed with contact guard assist using the least restrictive device within 7 day(s).  4.  Patient will ambulate with contact guard assist for 50 feet with the least restrictive device within 7 day(s).     5/26/2025 1528 by Charles Ace, PT  Outcome: Not Progressing

## 2025-05-27 NOTE — DISCHARGE SUMMARY
Discharge Summary       PATIENT ID: Rosana Thorne  MRN: [id number]   YOB: 1960    DATE OF ADMISSION: 5/25/2025  6:36 AM    DATE OF DISCHARGE: 5/27/2025   PRIMARY CARE PROVIDER: Ana Armendariz MD     ATTENDING PHYSICIAN:  Echo Hutchins MD  DISCHARGING PROVIDER: Echo Hutchins MD    To contact this individual call [phone] and ask the  to page.  If unavailable ask to be transferred the Adult Hospitalist Department.    CONSULTATIONS:   IP CONSULT TO HOSPITALIST  IP CONSULT TO OTOLARYNGOLOGY    PROCEDURES/SURGERIES: * No surgery found *    ADMITTING DIAGNOSES & HOSPITAL COURSE:     \"Rosana Thorne is a 65 y.o. female with PMH of Alzheimer dementia, Mood disorder, Dyslipidemia, and GERD who is directly [facility] after she presented with fall, right side frontal hematoma and epistaxis about 6:30 am. Patient is a poor historian, answered very simple question, unable to obtained detail information from her. On exam her nos bleeding had stopped. Per her friend on the phone, he stated that he was called at 6:30 am from [facility]'s staff after she had an unwitnessed fall, face down and bleeding form her right nose. She has also bruise and swelling on her right side forehead. No reported seizure activity. She walks independently at base line. No history of heart disease, DM, HTN, CKD or arrhythmia. She has difficulty on holding utensil especially when she eats.     The patient denies any headache, blurry vision, sore throat, trouble swallowing, trouble with speech, chest pain, SOB, cough, fever, chills, N/V/D, or abd pain.       [facility];  V/S BP 11/83, P 80, RR 18, T 97.3, SpO2 99  CT head impression  1. No intracranial hemorrhage.  2. Cerebral volume loss and ventricular enlargement.  3. Right maxillary sinusitis.  4. Probable cartilage fracture in the nose. No osseous fracture.  Chest x ray No acute process on portable

## 2025-05-27 NOTE — PROGRESS NOTES
1208: Torie RN called and gave SBAR report to Alejandrina, receiving nurse at [facility]. Torie answered all of Alejandrina's questions.   1245: tech removing both of pt's L arm IVs and getting pt ready for DC.  1300: [facility] here to take pt back to [facility]. Torie RN gave SBAR report to transporter and RN answered all of transporters questions.   1315: pt leaving with [facility]. Pt stable, A&O x1, and on room air when leaving.

## 2025-05-30 LAB
BACTERIA SPEC CULT: NORMAL
BACTERIA SPEC CULT: NORMAL
SERVICE CMNT-IMP: NORMAL
SERVICE CMNT-IMP: NORMAL